# Patient Record
Sex: FEMALE | Race: WHITE | NOT HISPANIC OR LATINO | Employment: UNEMPLOYED | URBAN - METROPOLITAN AREA
[De-identification: names, ages, dates, MRNs, and addresses within clinical notes are randomized per-mention and may not be internally consistent; named-entity substitution may affect disease eponyms.]

---

## 2023-04-12 LAB
ANION GAP IN SER/PLAS: 16 MMOL/L (ref 10–20)
CALCIUM (MG/DL) IN SER/PLAS: 9 MG/DL (ref 8.6–10.3)
CARBON DIOXIDE, TOTAL (MMOL/L) IN SER/PLAS: 24 MMOL/L (ref 21–32)
CHLORIDE (MMOL/L) IN SER/PLAS: 100 MMOL/L (ref 98–107)
CREATININE (MG/DL) IN SER/PLAS: 1.32 MG/DL (ref 0.5–1.05)
GFR FEMALE: 50 ML/MIN/1.73M2
GLUCOSE (MG/DL) IN SER/PLAS: 105 MG/DL (ref 74–99)
POTASSIUM (MMOL/L) IN SER/PLAS: 4.5 MMOL/L (ref 3.5–5.3)
SODIUM (MMOL/L) IN SER/PLAS: 135 MMOL/L (ref 136–145)
THYROTROPIN (MIU/L) IN SER/PLAS BY DETECTION LIMIT <= 0.05 MIU/L: 0.79 MIU/L (ref 0.44–3.98)
UREA NITROGEN (MG/DL) IN SER/PLAS: 22 MG/DL (ref 6–23)

## 2023-04-13 LAB — CALCIDIOL (25 OH VITAMIN D3) (NG/ML) IN SER/PLAS: 37 NG/ML

## 2023-05-09 LAB
CALCIUM (MG/DL) IN URINE: 10.3 MG/DL
CALCIUM (MG/L) IN 24 HOUR URINE: 263 MG/24H (ref 100–300)
COLLECTION DURATION OF URINE: 24 HR
CREATININE (MG/24HR) IN 24 HOUR URINE: 1.13 G/24H (ref 0.67–1.59)
CREATININE (MG/DL) IN URINE: 44.4 MG/DL (ref 20–320)
VOLUME OF URINE: 2550 ML

## 2023-08-08 ENCOUNTER — HOSPITAL ENCOUNTER (OUTPATIENT)
Dept: DATA CONVERSION | Facility: HOSPITAL | Age: 48
Discharge: HOME | End: 2023-08-08
Attending: EMERGENCY MEDICINE

## 2023-08-08 DIAGNOSIS — R07.89 OTHER CHEST PAIN: ICD-10-CM

## 2023-08-08 DIAGNOSIS — R07.9 CHEST PAIN, UNSPECIFIED: ICD-10-CM

## 2023-08-08 LAB
ANION GAP SERPL CALCULATED.3IONS-SCNC: 11 MMOL/L (ref 0–19)
BASOPHILS # BLD AUTO: 0.03 K/UL (ref 0–0.22)
BASOPHILS NFR BLD AUTO: 0.7 % (ref 0–1)
BUN SERPL-MCNC: 23 MG/DL (ref 8–25)
BUN/CREAT SERPL: 16.4 RATIO (ref 8–21)
CALCIUM SERPL-MCNC: 9 MG/DL (ref 8.5–10.4)
CHLORIDE SERPL-SCNC: 105 MMOL/L (ref 97–107)
CO2 SERPL-SCNC: 24 MMOL/L (ref 24–31)
CREAT SERPL-MCNC: 1.4 MG/DL (ref 0.4–1.6)
DEPRECATED RDW RBC AUTO: 39.9 FL (ref 37–54)
DIFFERENTIAL METHOD BLD: ABNORMAL
EOSINOPHIL # BLD AUTO: 0.07 K/UL (ref 0–0.45)
EOSINOPHIL NFR BLD: 1.7 % (ref 0–3)
ERYTHROCYTE [DISTWIDTH] IN BLOOD BY AUTOMATED COUNT: 12.9 % (ref 11.7–15)
GFR SERPL CREATININE-BSD FRML MDRD: 47 ML/MIN/1.73 M2
GLUCOSE SERPL-MCNC: 115 MG/DL (ref 65–99)
HCT VFR BLD AUTO: 36 % (ref 36–44)
HGB BLD-MCNC: 12.3 GM/DL (ref 12–15)
HS TROPONIN T DELTA: 1 (ref 0–4)
HS TROPONIN T DELTA: NORMAL (ref 0–4)
IMM GRANULOCYTES # BLD AUTO: 0.01 K/UL (ref 0–0.1)
LYMPHOCYTES # BLD AUTO: 2.21 K/UL (ref 1.2–3.2)
LYMPHOCYTES NFR BLD MANUAL: 52.1 % (ref 20–40)
MCH RBC QN AUTO: 29.3 PG (ref 26–34)
MCHC RBC AUTO-ENTMCNC: 34.2 % (ref 31–37)
MCV RBC AUTO: 85.7 FL (ref 80–100)
MONOCYTES # BLD AUTO: 0.32 K/UL (ref 0–0.8)
MONOCYTES NFR BLD MANUAL: 7.5 % (ref 0–8)
NEUTROPHILS # BLD AUTO: 1.6 K/UL
NEUTROPHILS # BLD AUTO: 1.6 K/UL (ref 1.8–7.7)
NEUTROPHILS.IMMATURE NFR BLD: 0.2 % (ref 0–1)
NEUTS SEG NFR BLD: 37.8 % (ref 50–70)
NRBC BLD-RTO: 0 /100 WBC
PLATELET # BLD AUTO: 233 K/UL (ref 150–450)
PMV BLD AUTO: 8.5 CU (ref 7–12.6)
POTASSIUM SERPL-SCNC: 3.9 MMOL/L (ref 3.4–5.1)
RBC # BLD AUTO: 4.2 M/UL (ref 4–4.9)
SODIUM SERPL-SCNC: 140 MMOL/L (ref 133–145)
TROPONIN T SERPL-MCNC: 7 NG/L
TROPONIN T SERPL-MCNC: 8 NG/L
WBC # BLD AUTO: 4.2 K/UL (ref 4.5–11)

## 2023-10-16 PROBLEM — L65.9 ALOPECIA: Status: ACTIVE | Noted: 2023-10-16

## 2023-10-16 PROBLEM — N65.1 BREAST ASYMMETRY BETWEEN NATIVE BREAST AND RECONSTRUCTED BREAST: Status: ACTIVE | Noted: 2023-10-16

## 2023-10-16 PROBLEM — N17.9 ACUTE RENAL FAILURE SYNDROME (CMS-HCC): Status: ACTIVE | Noted: 2023-10-16

## 2023-10-16 PROBLEM — I10 HYPERTENSIVE DISORDER: Status: ACTIVE | Noted: 2023-10-16

## 2023-10-16 PROBLEM — R35.89 POLYURIA: Status: ACTIVE | Noted: 2023-10-16

## 2023-10-16 PROBLEM — E88.09 HYPOALBUMINEMIA: Status: ACTIVE | Noted: 2023-10-16

## 2023-10-16 PROBLEM — R91.1 PULMONARY NODULE, LEFT: Status: ACTIVE | Noted: 2023-10-16

## 2023-10-16 PROBLEM — R53.1 ASTHENIA: Status: ACTIVE | Noted: 2023-10-16

## 2023-10-16 PROBLEM — C73 CARCINOMA OF THYROID GLAND (MULTI): Status: ACTIVE | Noted: 2023-10-16

## 2023-10-16 PROBLEM — R74.8 ABNORMAL LIVER ENZYMES: Status: ACTIVE | Noted: 2023-10-16

## 2023-10-16 PROBLEM — R56.9 SEIZURE (MULTI): Status: ACTIVE | Noted: 2023-10-16

## 2023-10-16 PROBLEM — N62 MACROMASTIA: Status: ACTIVE | Noted: 2023-10-16

## 2023-10-16 PROBLEM — R55 VASOVAGAL SYNCOPE: Status: ACTIVE | Noted: 2023-10-16

## 2023-10-16 PROBLEM — C50.911 MALIGNANT NEOPLASM OF UNSPECIFIED SITE OF RIGHT FEMALE BREAST (MULTI): Status: ACTIVE | Noted: 2023-10-16

## 2023-10-16 PROBLEM — R53.81 MALAISE: Status: ACTIVE | Noted: 2023-10-16

## 2023-10-16 PROBLEM — G95.9 CERVICAL MYELOPATHY (MULTI): Status: ACTIVE | Noted: 2023-10-16

## 2023-10-16 PROBLEM — R20.2 PARESTHESIA: Status: ACTIVE | Noted: 2023-10-16

## 2023-10-16 PROBLEM — C77.3 MALIGNANT NEOPLASM METASTATIC TO LYMPH NODE OF UPPER EXTREMITY (MULTI): Status: ACTIVE | Noted: 2023-10-16

## 2023-10-16 PROBLEM — E83.42 HYPOMAGNESEMIA: Status: ACTIVE | Noted: 2023-10-16

## 2023-10-16 PROBLEM — D12.6 TUBULAR ADENOMA OF COLON: Status: ACTIVE | Noted: 2023-10-16

## 2023-10-16 PROBLEM — C50.219 MALIGNANT NEOPLASM OF UPPER-INNER QUADRANT OF FEMALE BREAST (MULTI): Status: ACTIVE | Noted: 2023-10-16

## 2023-10-16 PROBLEM — E89.2 POSTSURGICAL HYPOPARATHYROIDISM (MULTI): Status: ACTIVE | Noted: 2023-10-16

## 2023-10-16 PROBLEM — F41.9 ANXIETY: Status: ACTIVE | Noted: 2023-10-16

## 2023-10-16 PROBLEM — D64.9 ANEMIA: Status: ACTIVE | Noted: 2023-10-16

## 2023-10-16 PROBLEM — G47.00 INSOMNIA: Status: ACTIVE | Noted: 2023-10-16

## 2023-10-16 PROBLEM — Z86.0100 HISTORY OF COLONIC POLYPS: Status: ACTIVE | Noted: 2023-10-16

## 2023-10-16 PROBLEM — G89.29 CHRONIC BACK PAIN: Status: ACTIVE | Noted: 2023-10-16

## 2023-10-16 PROBLEM — R07.89 ATYPICAL CHEST PAIN: Status: ACTIVE | Noted: 2023-10-16

## 2023-10-16 PROBLEM — I20.9 ANGINA PECTORIS (CMS-HCC): Status: ACTIVE | Noted: 2023-10-16

## 2023-10-16 PROBLEM — E83.52 HYPERCALCEMIA: Status: ACTIVE | Noted: 2023-10-16

## 2023-10-16 PROBLEM — E83.51 HYPOCALCEMIA: Status: ACTIVE | Noted: 2023-10-16

## 2023-10-16 PROBLEM — R55 NEAR SYNCOPE: Status: ACTIVE | Noted: 2023-10-16

## 2023-10-16 PROBLEM — N39.3 STRESS INCONTINENCE: Status: ACTIVE | Noted: 2023-10-16

## 2023-10-16 PROBLEM — C73 MALIGNANT TUMOR OF THYROID GLAND (MULTI): Status: ACTIVE | Noted: 2023-10-16

## 2023-10-16 PROBLEM — Z86.010 HISTORY OF COLONIC POLYPS: Status: ACTIVE | Noted: 2023-10-16

## 2023-10-16 PROBLEM — G57.00 PIRIFORMIS SYNDROME: Status: ACTIVE | Noted: 2023-10-16

## 2023-10-16 PROBLEM — R06.09 DYSPNEA ON EXERTION: Status: ACTIVE | Noted: 2023-10-16

## 2023-10-16 PROBLEM — K58.1 IRRITABLE BOWEL SYNDROME WITH CONSTIPATION: Status: ACTIVE | Noted: 2023-10-16

## 2023-10-16 PROBLEM — E03.9 HYPOTHYROIDISM: Status: ACTIVE | Noted: 2023-10-16

## 2023-10-16 PROBLEM — H69.91 DYSFUNCTION OF RIGHT EUSTACHIAN TUBE: Status: ACTIVE | Noted: 2023-10-16

## 2023-10-16 PROBLEM — M54.9 CHRONIC BACK PAIN: Status: ACTIVE | Noted: 2023-10-16

## 2023-10-16 RX ORDER — EPINEPHRINE 0.3 MG/.3ML
1 INJECTION SUBCUTANEOUS
COMMUNITY
Start: 2014-01-21

## 2023-10-16 RX ORDER — CYCLOBENZAPRINE HCL 10 MG
10 TABLET ORAL 3 TIMES DAILY
COMMUNITY

## 2023-10-16 RX ORDER — CALCITRIOL 0.25 UG/1
1 CAPSULE ORAL DAILY
COMMUNITY
End: 2024-05-17 | Stop reason: WASHOUT

## 2023-10-16 RX ORDER — LEVOTHYROXINE SODIUM 200 UG/1
1 TABLET ORAL
COMMUNITY

## 2023-10-16 RX ORDER — ZOLPIDEM TARTRATE 10 MG/1
1 TABLET ORAL NIGHTLY PRN
COMMUNITY

## 2023-10-16 RX ORDER — TRAZODONE HYDROCHLORIDE 50 MG/1
1 TABLET ORAL NIGHTLY PRN
COMMUNITY

## 2023-10-16 RX ORDER — ESOMEPRAZOLE MAGNESIUM 40 MG/1
1 CAPSULE, DELAYED RELEASE ORAL EVERY 24 HOURS
COMMUNITY

## 2023-10-16 RX ORDER — ALPRAZOLAM 0.25 MG/1
0.25 TABLET ORAL EVERY 12 HOURS
COMMUNITY

## 2023-10-16 RX ORDER — PANTOPRAZOLE SODIUM 20 MG/1
1 TABLET, DELAYED RELEASE ORAL DAILY
COMMUNITY

## 2023-10-16 RX ORDER — SUCRALFATE 1 G/1
1 TABLET ORAL
COMMUNITY

## 2023-10-16 RX ORDER — LORAZEPAM 1 MG/1
1.5 TABLET ORAL EVERY 24 HOURS
COMMUNITY

## 2023-10-16 RX ORDER — HYDROCODONE BITARTRATE AND ACETAMINOPHEN 5; 325 MG/1; MG/1
1 TABLET ORAL EVERY 6 HOURS
COMMUNITY

## 2023-10-16 RX ORDER — BUSPIRONE HYDROCHLORIDE 30 MG/1
1 TABLET ORAL 2 TIMES DAILY
COMMUNITY
Start: 2016-08-22

## 2023-10-16 RX ORDER — CALCIUM CARBONATE 300MG(750)
1 TABLET,CHEWABLE ORAL NIGHTLY
COMMUNITY
Start: 2021-07-15

## 2023-10-16 RX ORDER — SERTRALINE HYDROCHLORIDE 100 MG/1
1.5 TABLET, FILM COATED ORAL DAILY
COMMUNITY
Start: 2014-09-18

## 2023-10-16 RX ORDER — DULOXETIN HYDROCHLORIDE 60 MG/1
60 CAPSULE, DELAYED RELEASE ORAL DAILY
COMMUNITY
End: 2024-03-19 | Stop reason: WASHOUT

## 2023-10-16 RX ORDER — GABAPENTIN 600 MG/1
1 TABLET ORAL DAILY
COMMUNITY

## 2023-10-16 RX ORDER — POLYETHYLENE GLYCOL 3350 17 G/17G
17 POWDER, FOR SOLUTION ORAL DAILY
COMMUNITY
Start: 2021-10-26

## 2023-11-30 ENCOUNTER — TELEPHONE (OUTPATIENT)
Dept: PAIN MEDICINE | Facility: CLINIC | Age: 48
End: 2023-11-30
Payer: COMMERCIAL

## 2023-11-30 ENCOUNTER — TELEPHONE (OUTPATIENT)
Dept: RADIOLOGY | Facility: HOSPITAL | Age: 48
End: 2023-11-30

## 2023-11-30 NOTE — TELEPHONE ENCOUNTER
"Patient states on voicemail message that she is \"having an issue with her medication and needs help\". Called patient, left voicemail message with my phone number. Will call her again Monday 12/4/23 as I am out of the office until then. Advised her to go to the ER or urgent care if the medication reaction is severe and/or she is having any trouble breathing. The voicemail message I received was very vague and did not name any specific medication so I am not sure which one she thinks is causing the issue.  "

## 2023-12-07 RX ORDER — TOPIRAMATE 100 MG/1
100 TABLET, FILM COATED ORAL 2 TIMES DAILY
Qty: 60 TABLET | Refills: 0 | OUTPATIENT
Start: 2023-12-07

## 2023-12-08 NOTE — PROGRESS NOTES
Subjective   Patient ID: Derrick Butler is a 48 y.o. female who presents to office for breast examination.  HPI  Patient has not been in office since 10/04/2021, patient with normal imaging and breast examination at that time was to follow up in a year.   Patient had diagnostic bilateral mammogram on 03/02/2023 with comparison imaging to 08/24/2021, 02/14/2020 and 10/15/2019 it demonstrated; the parenchyma of both breasts remain mainly fatty replaced, no significant change since the prior study. Category 1.   No new oncology notes.     Past Medical History:   Diagnosis Date    Breast cancer (CMS/[facility]) 03/01/2018    Right Breast - IDC    Chronic rhinitis 05/04/2015    Rhinosinusitis    Fibromyalgia     Personal history of gestational diabetes     History of gestational diabetes    Personal history of other diseases of the musculoskeletal system and connective tissue     Personal history of fibromyalgia    Personal history of other diseases of the nervous system and sense organs     History of migraine    Personal history of other diseases of the respiratory system 05/04/2015    History of sore throat    Personal history of other mental and behavioral disorders     History of depression    Personal history of other specified conditions     History of insomnia    Thyroid cancer (CMS/[facility]) 2013      Past Surgical History:   Procedure Laterality Date    ADENOIDECTOMY  11/10/2016    Adenoidectomy    AXILLARY NODE DISSECTION  06/20/2018    Right Axillary Dissection    BACK SURGERY  02/10/2014    Back Surgery    BI MAMMO GUIDED LOCALIZATION BREAST RIGHT Right 05/16/2018    Wire localization lumpectomy right breast with sentinel node biopsy    BI STEREOTACTIC GUIDED BREAST RIGHT LOCALIZATION AND BIOPSY Right 04/18/2018    BI STEREOTACTIC GUIDED BREAST LOCALIZATION AND BIOPSY RIGHT [facility]    BI US GUIDED BREAST LOCALIZATION AND BIOPSY LEFT Left 03/22/2018    BI US GUIDED BREAST LOCALIZATION AND BIOPSY LEFT [facility]    BLADDER SURGERY  02/10/2014    Bladder Surgery    CHOLECYSTECTOMY  02/10/2014    Cholecystectomy    COLONOSCOPY      OTHER SURGICAL HISTORY  05/07/2019    Lumpectomy    OTHER SURGICAL HISTORY  05/07/2019    Breast reconstruction    OTHER SURGICAL HISTORY  05/07/2019    Axillary lymphadenectomy    OTHER SURGICAL HISTORY  05/22/2017    Primary Repair Of Ruptured Achilles Tendon    TONSILLECTOMY  02/10/2014    Tonsillectomy    TOTAL ABDOMINAL HYSTERECTOMY W/ BILATERAL SALPINGOOPHORECTOMY  2019    TOTAL THYROIDECTOMY  11/10/2016    Thyroid Surgery Total Thyroidectomy    TUBAL LIGATION  02/10/2014    Tubal Ligation      Family History   Problem Relation Name Age of Onset    Breast cancer Mother's Sister      Breast cancer Maternal Grandmother        Allergies   Allergen Reactions    Docetaxel Shortness of breath    Meloxicam Shortness of breath, Hives and Rash    Oxycodone-Acetaminophen Hives, Shortness of breath, Itching and Swelling    Pregabalin Palpitations      SOCIAL HISTORY:   Patient is  she lives with her spouse, she does not currently work.   Review of Systems   Constitutional:  Negative for appetite change, fever and unexpected weight change.   HENT:  Negative for congestion and trouble swallowing.    Respiratory:  Negative for cough and shortness of breath.    Cardiovascular:  Negative for chest pain and palpitations.   Gastrointestinal:  Negative for abdominal pain, diarrhea and nausea.   Genitourinary:  Negative for difficulty urinating and dysuria.   Musculoskeletal:  Negative for back pain and gait problem.   Skin:  Negative for color change and rash.   Neurological:  Negative for dizziness, speech difficulty and headaches.   Hematological:  Does not bruise/bleed easily.   Psychiatric/Behavioral:  Negative for confusion and suicidal ideas.      Objective   Physical Exam  Physical Exam:  GENERAL APPEARANCE: Patient appears in no acute distress.   EYES: Sclera non-icteric, PERRLA.    ENT Normal appearance of ears and nose.   NECK/THYROID: Neck: no masses. Thyroid: no masses.   LYMPH NODES: No cervical or supraclavicular lymphadenopathy.   CARDIOVASCULAR Heart: RRR, no murmurs; Carotid bruits: none; Peripheral edema: none.   RESPIRATORY: Lungs: Bilateral inspiratory and expiratory wheezing; no respiratory distress.   BREASTS: Exam done both in upright and supine position. On inspection no skin dimpling, no peau d'orange; Masses: none palpable in either breast.  Axillary lymphadenopathy: none.   GI (ABDOMEN) No intraabdominal mass appreciated, no hepatosplenomegaly; Hernia: none; Tenderness: none.   PSYCH: Patient oriented to time, place and person, normal affect.    Assessment/Plan   Problem List Items Addressed This Visit    None       BREAST HISTORY:   Patient felt a lump in the right breast laterally. She underwent a bilateral diagnostic mammogram on 2/22/18 that showed a large spiculated lesion in the lateral aspect in the right breast medium depth, measuring approximately 3 cm, with associated pleomophic microcalcifications. In the left breast laterally was an oval nodule measuring 11 x 6 mm with partially obscured borders.   US of the right revealed an irregular hypochoic mass at 9 o'clock, 12 cm DFN, measuring 2.9 x 3.3 cm, with spiculated margins, posterior shadowing, and internal blood flow. Birads category 5. US-guided biopsy was recommended. US of the left revealed a normal hypoechoic lesion at 2 o'clock, 9.7 cm DFN, measuring 3.7 x 4.7 x 8.8 mm with a slightly irregular border and possible minimal posterior shadowing. Birads category 4a. US-guided biopsy was recommended here as well. Films were reviewed. She first felt the mass in December. No pain, no nipple discharge.   She underwent bilateral core biopsies on 3/1/18. Pathology for the right breast revealed invasive ductal carcinoma, with the tumor measuring up to 0.3 cm in greatest contiguous dimension, grade 1 of 3, ER/DE+m  Fgl9dkg negative, FISH not performed. Pathology for the left breast mass showed mildfiborcystic change including duct ectasia, mammary fibrosis, columnar cell change, usual ductal hyperplasia, and microcalcifications.     She had undergone an additional US-guided biopsy on 3/19 of the left breast at 2 o'clock that revealed pseudo-angiomatous stromal hyperplasia and fibrocystic changes; fat necrosis, giant cell reaction and chronic inflammation, with no evidence of malignancy.    Her BRCA testing came back negative. She underwent a right breast stereotactic biopsy on 4/13 to r/o DCIS. Pathology revealed breast parenchyma with focal usual ductal hyperplasia and apocrine metaplasia with microcalcifications.  The patient is s/p right breast wire localization/lumpectomy/sentinel node biopsy on 5/16/8, with Dr. Craven performing bilateral breast reduction following.  Pathology revealed invasive ductal carcinoma, with the tumor measuring 4.4 x 4 x 1.8 cm, with disease present at the medial margin but intra-operatively I took more medial margin which was negative so we had negative margins.  tumor was   invasive ductal carcinoma and intraductal papillomatosis, grade 2 of 3; DCIS, cribriform and comedo type, intermediate to high nuclear grade, with clean margins; and fibrocysitic changes. At the sentinel node excision site, metastic carcinoma involving 3 of 3 lymph nodes was present, the largest measuring 0.8 cm in greatest dimension.  The new medial margin excision showed focal invasive ductal carcinoma, as mentioned earlier with clean margn. Right breast tissue taken as part of the reduction, showed no evidence of malignancy, with ductal epithelial hyperplasia, usual type; and fibrocystic changes with focal microcalcifications. Skin showed no evidence of malignancy. Left breast tissue showed no evidence of malignancy; ductal epithelial hyperplasia, usual type; and fibrocystic changes with microcalcifications. Er/AL+,  Aon7vod negative, FISH not performed.   It was recommedned to perform a regional axillary disseciotn based on 3/3 postive nodes.   The patient is s/p a right axillary dissection on 6/20/18. .    Six lymph nodes were negative for malignancy.  She missed her postop appt on 6/20 as she was in the hospital for a  hypercalcemia, weakness, dizziness, constipation, elevated liver enzymes and dry mouth as well as an acute kidney injury. She was treated with IV fluids and Lasix and was released on 7/2.  She had a MediPort placed while I was out of town and tried chemotherapy but had bad reactions.  She refused radiation therapy due to her bad reaction to chemotherapy.  She was to start hormone therapy. Her mediport was removed on 4/2/19.  The patient felt a new lump in the right breast at 6 o'clock mid anterior depth.  She underwent a bilateral diagnostic mammogram with guero on 8/24/21, compared to diagnostic imaging on 12/9/20, 2/13/20, 10/15/19, 4/10/19, 2/22/18 and 1/5/18.  Noted were scattered areas of fibroglandular density.  Right breast showed a 6 mm nodular density at 12 o'clock, likely present and unchanged since her mammogram from 2/13/20.  Right ultrasound revealed no change in the palpable area of concern at 6 o'clock.  No solid or cystic mass was noted in that area.  However, at 12 o'clock, 2.8 cm DFN is a cyst with a small amount of internal debris measuring 6 mm.  Birads category 2.  Films were reviewed.  Her last breast check with me was on 10/19/20. At that time, her axillary incision had healed nicely.  Some nodules on the inframammary crease of both breasts were noted.  She had otherwise normal imaging and exam.  She was put on Arimidex per Dr. Poon on 4/17/19.  Previous Biopsy: no Menarche Age: 14 Age of first delivery: 22 Breastfeed: no Menopause age: not yet HRT: no Family history of breast cancer: ys Family history of ovarian cancer: no Family history of pancreatic cancer: no G 3 P 2     Sadia SIMPSON  MD Radha 12/08/23 1:34 PM

## 2023-12-11 DIAGNOSIS — M79.7 FIBROMYALGIA: ICD-10-CM

## 2023-12-11 DIAGNOSIS — M54.6 PAIN IN THORACIC SPINE: ICD-10-CM

## 2023-12-11 DIAGNOSIS — M54.2 NECK PAIN: ICD-10-CM

## 2023-12-11 RX ORDER — TOPIRAMATE 25 MG/1
TABLET ORAL
Qty: 252 TABLET | Refills: 0 | Status: CANCELLED | OUTPATIENT
Start: 2023-12-11

## 2023-12-11 NOTE — TELEPHONE ENCOUNTER
Patient keeps calling central scheduling...says she is having issues with topiramate and wants to stop. Another message says she needs a refill. I have left 2 messages with the patient asking for clarification along with my phone # but she never calls me back. Pharmacy sent message for refill. Waiting for patient to call me back to see if she wants to wean off medication.  UPDATE:  Patient wants to see if lesser dose will help with side effects. Renewed rx at 75mg BID, explain she can go a bit lower if she would like. Invited her to call back after she tries the new dose.

## 2023-12-12 RX ORDER — TOPIRAMATE 100 MG/1
100 TABLET, FILM COATED ORAL 2 TIMES DAILY
Qty: 60 TABLET | Refills: 11 | Status: SHIPPED | OUTPATIENT
Start: 2023-12-12 | End: 2023-12-12 | Stop reason: WASHOUT

## 2023-12-12 RX ORDER — TOPIRAMATE 25 MG/1
75 TABLET ORAL 2 TIMES DAILY
Qty: 180 TABLET | Refills: 2 | Status: SHIPPED | OUTPATIENT
Start: 2023-12-12 | End: 2024-03-19 | Stop reason: WASHOUT

## 2023-12-12 RX ORDER — TOPIRAMATE 25 MG/1
75 TABLET ORAL 2 TIMES DAILY
COMMUNITY
End: 2023-12-12 | Stop reason: SDUPTHER

## 2023-12-13 ENCOUNTER — TELEPHONE (OUTPATIENT)
Dept: PAIN MEDICINE | Facility: CLINIC | Age: 48
End: 2023-12-13
Payer: COMMERCIAL

## 2023-12-13 NOTE — TELEPHONE ENCOUNTER
Representative from patient's insurance left me a message that Derrick wants home PT due to severe anxiety. Attempted to call patient. Her phone hung up while I was leaving a voicemail. I called back and there was extremely loud static on phone line but I attempted to leave another message. I am unable to refer her to home health care for PT at this time because it has been greater than 90 days since her last in person visit. Per Dr. Vazquez, she should try to see in her PCP can put in home referral as they may be able to see her sooner and address the anxiety issues. Otherwise the patient will need to be seen for a follow up before we can put home PT order in.

## 2023-12-14 ENCOUNTER — OFFICE VISIT (OUTPATIENT)
Dept: SURGERY | Facility: CLINIC | Age: 48
End: 2023-12-14
Payer: COMMERCIAL

## 2023-12-14 VITALS
WEIGHT: 215 LBS | RESPIRATION RATE: 20 BRPM | HEIGHT: 62 IN | SYSTOLIC BLOOD PRESSURE: 135 MMHG | DIASTOLIC BLOOD PRESSURE: 84 MMHG | BODY MASS INDEX: 39.56 KG/M2 | TEMPERATURE: 98.3 F

## 2023-12-14 DIAGNOSIS — C50.211 MALIGNANT NEOPLASM OF UPPER-INNER QUADRANT OF RIGHT BREAST IN FEMALE, ESTROGEN RECEPTOR NEGATIVE (MULTI): ICD-10-CM

## 2023-12-14 DIAGNOSIS — Z17.1 MALIGNANT NEOPLASM OF UPPER-INNER QUADRANT OF RIGHT BREAST IN FEMALE, ESTROGEN RECEPTOR NEGATIVE (MULTI): ICD-10-CM

## 2023-12-14 PROCEDURE — 99203 OFFICE O/P NEW LOW 30 MIN: CPT | Performed by: SURGERY

## 2023-12-14 PROCEDURE — 1036F TOBACCO NON-USER: CPT | Performed by: SURGERY

## 2023-12-14 PROCEDURE — 99213 OFFICE O/P EST LOW 20 MIN: CPT | Performed by: SURGERY

## 2023-12-14 PROCEDURE — 3075F SYST BP GE 130 - 139MM HG: CPT | Performed by: SURGERY

## 2023-12-14 PROCEDURE — 3079F DIAST BP 80-89 MM HG: CPT | Performed by: SURGERY

## 2023-12-14 ASSESSMENT — ENCOUNTER SYMPTOMS
ABDOMINAL PAIN: 0
TROUBLE SWALLOWING: 0
DIZZINESS: 0
BACK PAIN: 0
COLOR CHANGE: 0
HEADACHES: 0
COUGH: 0
SHORTNESS OF BREATH: 0
BRUISES/BLEEDS EASILY: 0
DIARRHEA: 0
DIFFICULTY URINATING: 0
UNEXPECTED WEIGHT CHANGE: 0
PALPITATIONS: 0
NAUSEA: 0
DYSURIA: 0
CONFUSION: 0
APPETITE CHANGE: 0
SPEECH DIFFICULTY: 0
FEVER: 0

## 2023-12-14 ASSESSMENT — PAIN SCALES - GENERAL: PAINLEVEL: 0-NO PAIN

## 2024-01-11 ENCOUNTER — HOME HEALTH ADMISSION (OUTPATIENT)
Dept: HOME HEALTH SERVICES | Facility: HOME HEALTH | Age: 49
End: 2024-01-11
Payer: COMMERCIAL

## 2024-01-11 ENCOUNTER — DOCUMENTATION (OUTPATIENT)
Dept: HOME HEALTH SERVICES | Facility: HOME HEALTH | Age: 49
End: 2024-01-11

## 2024-01-11 ENCOUNTER — TELEPHONE (OUTPATIENT)
Dept: HOME HEALTH SERVICES | Facility: HOME HEALTH | Age: 49
End: 2024-01-11

## 2024-01-11 ENCOUNTER — OFFICE VISIT (OUTPATIENT)
Dept: PAIN MEDICINE | Facility: CLINIC | Age: 49
End: 2024-01-11
Payer: COMMERCIAL

## 2024-01-11 VITALS — RESPIRATION RATE: 16 BRPM

## 2024-01-11 DIAGNOSIS — F41.9 ANXIETY: ICD-10-CM

## 2024-01-11 DIAGNOSIS — M54.12 CERVICAL NEURITIS: Primary | ICD-10-CM

## 2024-01-11 DIAGNOSIS — M79.7 FIBROMYALGIA: ICD-10-CM

## 2024-01-11 PROCEDURE — 1036F TOBACCO NON-USER: CPT | Performed by: PHYSICAL MEDICINE & REHABILITATION

## 2024-01-11 PROCEDURE — 99214 OFFICE O/P EST MOD 30 MIN: CPT | Performed by: PHYSICAL MEDICINE & REHABILITATION

## 2024-01-11 ASSESSMENT — PAIN SCALES - GENERAL: PAINLEVEL: 6

## 2024-01-11 NOTE — ASSESSMENT & PLAN NOTE
The patient was explained that the mainstay of managing fibromyalgia/myofascial pain is to participate in low-impact aerobic exercising for 1 hour day, 6 days a week. Examples of low impact aerobic exercising include swimming, riding a stationary bicycle or exercising on an elliptical machine. Low-impact aerobic exercising at that rate results in much better pain relief than any of the medications that could be prescribed for fibromyalgia/myofascial pain and without side effects. The patient must be compliant, however, and must participate in such therapy for 8 weeks consecutively before noticing a remarkable response. It was explained to the patient that one could titrate up from a smaller duration of exercising by increasing the time spent doing the low-impact aerobic exercise in small increments such as 1 minute every day until meeting the 60 minutes a day, 6 days a week goal. The patient was receptive to the counseling and agrees to continue to exercise toward this goal.

## 2024-01-11 NOTE — PROGRESS NOTES
Subjective   Patient ID: Derrick Butler is a 49 y.o. female who presents for Neck Pain.  HPI    48-year-old female with history of fibromyalgia as well as neck pain with upper extremity radicular symptoms.  We have been trying to get physical therapy however patient has a significant amount of social anxiety and has been unable to leave her house to go to physical therapy and she is here today to request home health physical therapy.  Otherwise her symptoms are fairly similar to previous evaluation.  She is weaning off the topiramate if she was felt like it was causing her heart to race.  Continues to take gabapentin 300 mg 3 times daily.  Did not tolerate duloxetine.                Monitoring and compliance:    ORT:    PDUQ:    Office Agreement:      Review of Systems     Current Outpatient Medications   Medication Instructions    ALPRAZolam (XANAX) 0.25 mg, oral, Every 12 hours    busPIRone (Buspar) 30 mg tablet 1 tablet, oral, 2 times daily    calcitriol (Rocaltrol) 0.25 mcg capsule 1 capsule, oral, Daily    calcium carbonate (CALTRATE 600 ORAL) 2 tablets, oral, Daily    cyclobenzaprine (FLEXERIL) 10 mg, oral, 3 times daily    DULoxetine (CYMBALTA) 60 mg, oral, Daily    EPINEPHrine (EPIPEN) 1 mg,  Inject once in muscular portion of leg if having severe allergic reaction    esomeprazole (NexIUM) 40 mg DR capsule 1 capsule, oral, Every 24 hours    gabapentin (Neurontin) 600 mg tablet 1 tablet, oral, Daily    HYDROcodone-acetaminophen (Norco) 5-325 mg tablet 1 tablet, oral, Every 6 hours    levothyroxine (Synthroid, Levoxyl) 200 mcg tablet 1 tablet, oral, Daily before breakfast    LORazepam (Ativan) 1 mg tablet 1.5 tablets, oral, Every 24 hours    melatonin 10 mg tablet,disintegrating 1 tablet, oral, Nightly    pantoprazole (ProtoNix) 20 mg EC tablet 1 tablet, oral, Daily    polyethylene glycol (GLYCOLAX, MIRALAX) 17 g, oral, Daily, IN 8 OUNCES OF WATER, JUICE, OR TEA    sertraline (Zoloft) 100 mg tablet 1.5 tablets,  oral, Daily    sucralfate (CARAFATE) 1 g, oral, 2 times daily before meals    topiramate (TOPAMAX) 75 mg, oral, 2 times daily    traZODone (Desyrel) 50 mg tablet 1 tablet, oral, Nightly PRN    zolpidem (Ambien) 10 mg tablet 1 tablet, oral, Nightly PRN        Past Medical History:   Diagnosis Date    Breast cancer (CMS/[facility]) 03/01/2018    Right Breast - IDC    Chronic rhinitis 05/04/2015    Rhinosinusitis    Fibromyalgia     Hx antineoplastic chemo 2018    rt breast cancer    Personal history of gestational diabetes     History of gestational diabetes    Personal history of other diseases of the musculoskeletal system and connective tissue     Personal history of fibromyalgia    Personal history of other diseases of the nervous system and sense organs     History of migraine    Personal history of other diseases of the respiratory system 05/04/2015    History of sore throat    Personal history of other mental and behavioral disorders     History of depression    Personal history of other specified conditions     History of insomnia    Thyroid cancer (CMS/[facility]) 2013        Past Surgical History:   Procedure Laterality Date    ADENOIDECTOMY  11/10/2016    Adenoidectomy    AXILLARY NODE DISSECTION  06/20/2018    Right Axillary Dissection    BACK SURGERY  02/10/2014    Back Surgery    BI MAMMO GUIDED LOCALIZATION BREAST RIGHT Right 05/16/2018    Wire localization lumpectomy right breast with sentinel node biopsy    BI STEREOTACTIC GUIDED BREAST RIGHT LOCALIZATION AND BIOPSY Right 04/18/2018    BI STEREOTACTIC GUIDED BREAST LOCALIZATION AND BIOPSY RIGHT [facility]    BI US GUIDED BREAST LOCALIZATION AND BIOPSY LEFT Left 03/22/2018    BI US GUIDED BREAST LOCALIZATION AND BIOPSY LEFT [facility]    BLADDER SURGERY  02/10/2014    Bladder Surgery    BREAST RECONSTRUCTION Bilateral 2018    Rt breast lumpectomy, bilat reconstruction    CHOLECYSTECTOMY  02/10/2014    Cholecystectomy    COLONOSCOPY      OTHER  SURGICAL HISTORY  05/07/2019    Lumpectomy    OTHER SURGICAL HISTORY  05/07/2019    Breast reconstruction    OTHER SURGICAL HISTORY  05/07/2019    Axillary lymphadenectomy    OTHER SURGICAL HISTORY  05/22/2017    Primary Repair Of Ruptured Achilles Tendon    TONSILLECTOMY  02/10/2014    Tonsillectomy    TOTAL ABDOMINAL HYSTERECTOMY W/ BILATERAL SALPINGOOPHORECTOMY  2019    TOTAL THYROIDECTOMY  11/10/2016    Thyroid Surgery Total Thyroidectomy    TUBAL LIGATION  02/10/2014    Tubal Ligation        Family History   Problem Relation Name Age of Onset    Breast cancer Mother's Sister      Breast cancer Maternal Grandmother          Allergies   Allergen Reactions    Docetaxel Shortness of breath    Meloxicam Shortness of breath, Hives and Rash    Oxycodone-Acetaminophen Hives, Shortness of breath, Itching and Swelling    Pregabalin Palpitations        Objective     Vitals:    01/11/24 1420   Resp: 16        Physical Exam    GENERAL EXAM  Vital Signs: Vital signs to include heart rate, respiration rate, blood pressure, and temperature were reviewed.  General Appearance:  Awake, alert, healthy appearing, well developed, No acute distress.  Head: Normocephalic without evidence of head injury.  Neck: The appearance of the neck was normal without swelling with a midline trachea.  Eyes: The eyelids and eyebrows exhibited no abnormalities.  Pupils were not pin-point.  Sclera was without icterus.  Lungs: Respiration rhythm and depth was normal.  Respiratory movements were normal without labored breathing.  Cardiovascular: No peripheral edema was present.    Neurological: Patient was oriented to time, place, and person.  Speech was normal.  Balance, gait, and stance were unremarkable.    Psychiatric: Appearance was normal with appropriate dress.  Mood was euthymic and affect was normal.  Skin: Affected regions were without ecchymosis or skin lesions.        Physical exam as above except:        Assessment/Plan   Problem List  Items Addressed This Visit             ICD-10-CM    Anxiety F41.9    Relevant Orders    Referral to Home [facility]    Fibromyalgia M79.7     The patient was explained that the mainstay of managing fibromyalgia/myofascial pain is to participate in low-impact aerobic exercising for 1 hour day, 6 days a week. Examples of low impact aerobic exercising include swimming, riding a stationary bicycle or exercising on an elliptical machine. Low-impact aerobic exercising at that rate results in much better pain relief than any of the medications that could be prescribed for fibromyalgia/myofascial pain and without side effects. The patient must be compliant, however, and must participate in such therapy for 8 weeks consecutively before noticing a remarkable response. It was explained to the patient that one could titrate up from a smaller duration of exercising by increasing the time spent doing the low-impact aerobic exercise in small increments such as 1 minute every day until meeting the 60 minutes a day, 6 days a week goal. The patient was receptive to the counseling and agrees to continue to exercise toward this goal.         Relevant Orders    Referral to Home [facility]    Cervical neuritis - Primary M54.12     48-year-old female with history of fibromyalgia as well as neck pain with radiation into her upper extremities.  We have not been able to do physical therapy as of yet because patient has severe social anxiety and has been unable to leave her home to go to physical therapy patient.  She is here to request home physical therapy.  In addition she is weaning off of the topiramate as she feels like it was causing her heart palpitations.  This is improving and she is weaning off.  Our plan for today:  - I will place an order for physical therapy through home health as patient has severe social anxiety and is unable to leave her home on a regular basis to attend physical therapy.  - We will increase her gabapentin to 600 mg  in the evening and 300 mg in the morning and afternoon.  She will call me when she needs a refill of this medication.  - If she continues to have pain after 6 weeks of physical therapy may follow up         Relevant Orders    Referral to Home Health            This note was generated with the aid of dictation software, there may be typos despite my attempts at proofreading.

## 2024-01-11 NOTE — HH CARE COORDINATION
Home Care received a Referral for Physical Therapy. We have processed the referral for a Start of Care on 1.12 to 1.14.2024.     If you have any questions or concerns, please feel free to contact us at [phone]. Follow the prompts, enter your five digit zip code, and you will be directed to your care team on EAST 1.

## 2024-01-11 NOTE — TELEPHONE ENCOUNTER
I am reviewing the referral for home care PT services. You have indicated that the patient's PCP will follow the home care. Unfortunately, the PCP is from [facility] and we do not accept [facility] physicians to follow home care as they do not sign orders/485 in a timely manner.  If you are willing to follow the home care, we would be able to accept. Otherwise, you will have to refer to [facility] or another agency for services.

## 2024-01-11 NOTE — ASSESSMENT & PLAN NOTE
48-year-old female with history of fibromyalgia as well as neck pain with radiation into her upper extremities.  We have not been able to do physical therapy as of yet because patient has severe social anxiety and has been unable to leave her home to go to physical therapy patient.  She is here to request home physical therapy.  In addition she is weaning off of the topiramate as she feels like it was causing her heart palpitations.  This is improving and she is weaning off.  Our plan for today:  - I will place an order for physical therapy through home health as patient has severe social anxiety and is unable to leave her home on a regular basis to attend physical therapy.  - We will increase her gabapentin to 600 mg in the evening and 300 mg in the morning and afternoon.  She will call me when she needs a refill of this medication.  - If she continues to have pain after 6 weeks of physical therapy may follow up

## 2024-01-18 ENCOUNTER — HOME CARE VISIT (OUTPATIENT)
Dept: HOME HEALTH SERVICES | Facility: HOME HEALTH | Age: 49
End: 2024-01-18
Payer: COMMERCIAL

## 2024-01-18 VITALS — HEART RATE: 98 BPM | DIASTOLIC BLOOD PRESSURE: 84 MMHG | RESPIRATION RATE: 16 BRPM | SYSTOLIC BLOOD PRESSURE: 118 MMHG

## 2024-01-18 PROCEDURE — 0023 HH SOC

## 2024-01-18 PROCEDURE — G0151 HHCP-SERV OF PT,EA 15 MIN: HCPCS

## 2024-01-18 ASSESSMENT — ACTIVITIES OF DAILY LIVING (ADL)
OASIS_M1830: 02
ENTERING_EXITING_HOME: SUPERVISION
AMBULATION ASSISTANCE ON FLAT SURFACES: 1

## 2024-01-18 ASSESSMENT — ENCOUNTER SYMPTOMS
PAIN: 1
LOWEST PAIN SEVERITY IN PAST 24 HOURS: 3/10
PAIN SEVERITY GOAL: 0/10
PERSON REPORTING PAIN: PATIENT
PAIN LOCATION - PAIN FREQUENCY: CONSTANT
SUBJECTIVE PAIN PROGRESSION: GRADUALLY IMPROVING
HIGHEST PAIN SEVERITY IN PAST 24 HOURS: 7/10
PAIN LOCATION - PAIN DURATION: CHRONIC
PAIN LOCATION - RELIEVING FACTORS: MEDS, REST, ICE
PAIN LOCATION - PAIN SEVERITY: 4/10
PAIN LOCATION - EXACERBATING FACTORS: ACTIVITY
PAIN LOCATION: NECK

## 2024-01-19 ENCOUNTER — APPOINTMENT (OUTPATIENT)
Dept: RADIOLOGY | Facility: HOSPITAL | Age: 49
End: 2024-01-19
Payer: COMMERCIAL

## 2024-01-25 ENCOUNTER — HOME CARE VISIT (OUTPATIENT)
Dept: HOME HEALTH SERVICES | Facility: HOME HEALTH | Age: 49
End: 2024-01-25
Payer: COMMERCIAL

## 2024-01-26 ENCOUNTER — HOSPITAL ENCOUNTER (OUTPATIENT)
Dept: RADIOLOGY | Facility: HOSPITAL | Age: 49
Discharge: HOME | End: 2024-01-26
Payer: COMMERCIAL

## 2024-01-26 VITALS — WEIGHT: 215 LBS | HEIGHT: 62 IN | BODY MASS INDEX: 39.56 KG/M2

## 2024-01-26 DIAGNOSIS — Z17.1 MALIGNANT NEOPLASM OF UPPER-INNER QUADRANT OF RIGHT BREAST IN FEMALE, ESTROGEN RECEPTOR NEGATIVE (MULTI): ICD-10-CM

## 2024-01-26 DIAGNOSIS — C50.211 MALIGNANT NEOPLASM OF UPPER-INNER QUADRANT OF RIGHT BREAST IN FEMALE, ESTROGEN RECEPTOR NEGATIVE (MULTI): ICD-10-CM

## 2024-01-26 PROCEDURE — 77062 BREAST TOMOSYNTHESIS BI: CPT

## 2024-01-30 ENCOUNTER — HOME CARE VISIT (OUTPATIENT)
Dept: HOME HEALTH SERVICES | Facility: HOME HEALTH | Age: 49
End: 2024-01-30
Payer: COMMERCIAL

## 2024-01-30 PROCEDURE — G0151 HHCP-SERV OF PT,EA 15 MIN: HCPCS

## 2024-01-30 ASSESSMENT — ENCOUNTER SYMPTOMS
PAIN LOCATION - PAIN QUALITY: ACHING
LOWEST PAIN SEVERITY IN PAST 24 HOURS: 5/10
HIGHEST PAIN SEVERITY IN PAST 24 HOURS: 8/10
PERSON REPORTING PAIN: PATIENT
PAIN SEVERITY GOAL: 0/10
PAIN LOCATION - PAIN DURATION: CHRONIC
SUBJECTIVE PAIN PROGRESSION: GRADUALLY IMPROVING
PAIN LOCATION - PAIN SEVERITY: 6/10
PAIN LOCATION - EXACERBATING FACTORS: ACTIVITY
PAIN LOCATION: NECK
PAIN LOCATION - RELIEVING FACTORS: REST
PAIN LOCATION - PAIN FREQUENCY: CONSTANT
PAIN: 1

## 2024-01-31 PROCEDURE — G0180 MD CERTIFICATION HHA PATIENT: HCPCS | Performed by: PHYSICAL MEDICINE & REHABILITATION

## 2024-02-07 ENCOUNTER — APPOINTMENT (OUTPATIENT)
Dept: HOME HEALTH SERVICES | Facility: HOME HEALTH | Age: 49
End: 2024-02-07
Payer: COMMERCIAL

## 2024-02-13 ENCOUNTER — HOME CARE VISIT (OUTPATIENT)
Dept: HOME HEALTH SERVICES | Facility: HOME HEALTH | Age: 49
End: 2024-02-13
Payer: COMMERCIAL

## 2024-02-13 VITALS
OXYGEN SATURATION: 98 % | RESPIRATION RATE: 20 BRPM | SYSTOLIC BLOOD PRESSURE: 118 MMHG | DIASTOLIC BLOOD PRESSURE: 78 MMHG

## 2024-02-13 PROCEDURE — G0299 HHS/HOSPICE OF RN EA 15 MIN: HCPCS

## 2024-02-13 ASSESSMENT — ENCOUNTER SYMPTOMS
FORGETFULNESS: 1
DESCRIPTION OF MEMORY LOSS: SHORT TERM
PAIN LOCATION - RELIEVING FACTORS: REST MEDS
PAIN LOCATION - RELIEVING FACTORS: REST MEDS
DESCRIPTION OF MEMORY LOSS: IMMEDIATE
PAIN LOCATION - PAIN FREQUENCY: FREQUENT
FATIGUES EASILY: 1
MUSCLE WEAKNESS: 1
PAIN LOCATION - PAIN SEVERITY: 4/10
HIGHEST PAIN SEVERITY IN PAST 24 HOURS: 8/10
DEPRESSION: 1
SUBJECTIVE PAIN PROGRESSION: UNCHANGED
PERSON REPORTING PAIN: PATIENT
PAIN SEVERITY GOAL: 0/10
LOWEST PAIN SEVERITY IN PAST 24 HOURS: 3/10
PAIN LOCATION - PAIN SEVERITY: 4/10
APPETITE LEVEL: FAIR
DIZZINESS: 1
PAIN LOCATION: GENERALIZED
PAIN LOCATION - PAIN QUALITY: GENERALIZED
FATIGUE: 1
PAIN LOCATION: NECK
PAIN LOCATION - PAIN FREQUENCY: FREQUENT
CHANGE IN APPETITE: UNCHANGED
LIMITED RANGE OF MOTION: 1
PAIN LOCATION - EXACERBATING FACTORS: VARIES
PAIN: 1
DEPRESSED MOOD: 1

## 2024-02-13 ASSESSMENT — ACTIVITIES OF DAILY LIVING (ADL)
TRANSPORTATION ASSESSED: 1
LAUNDRY: NEEDS ASSISTANCE
BATHING_CURRENT_FUNCTION: STAND BY ASSIST
USING THE TELPHONE: INDEPENDENT
SHOPPING: NEEDS ASSISTANCE
FEEDING ASSESSED: 1
AMBULATION ASSISTANCE: INDEPENDENT
AMBULATION ASSISTANCE: 1
FEEDING: INDEPENDENT
BATHING ASSESSED: 1
ORAL_CARE_CURRENT_FUNCTION: INDEPENDENT
TOILETING: INDEPENDENT
TRANSPORTATION: DEPENDENT
GROOMING ASSESSED: 1
PHYSICAL TRANSFERS ASSESSED: 1
LIGHT HOUSEKEEPING: NEEDS ASSISTANCE
DRESSING_UB_CURRENT_FUNCTION: INDEPENDENT
GROOMING_CURRENT_FUNCTION: INDEPENDENT
TOILETING: 1
LAUNDRY ASSESSED: 1
CURRENT_FUNCTION: INDEPENDENT
HOUSEKEEPING ASSESSED: 1
PREPARING MEALS: NEEDS ASSISTANCE
DRESSING_LB_CURRENT_FUNCTION: INDEPENDENT
TELEPHONE USE ASSESSED: 1
ORAL_CARE_ASSESSED: 1
SHOPPING ASSESSED: 1
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
## 2024-02-15 ENCOUNTER — HOME CARE VISIT (OUTPATIENT)
Dept: HOME HEALTH SERVICES | Facility: HOME HEALTH | Age: 49
End: 2024-02-15
Payer: COMMERCIAL

## 2024-02-15 PROCEDURE — G0151 HHCP-SERV OF PT,EA 15 MIN: HCPCS

## 2024-02-15 ASSESSMENT — ENCOUNTER SYMPTOMS
PAIN LOCATION - PAIN FREQUENCY: CONSTANT
PAIN: 1
PAIN SEVERITY GOAL: 0/10
SUBJECTIVE PAIN PROGRESSION: GRADUALLY IMPROVING
PAIN LOCATION - RELIEVING FACTORS: REST, HEAT
LOWEST PAIN SEVERITY IN PAST 24 HOURS: 4/10
PAIN LOCATION - EXACERBATING FACTORS: ACTIVITY
PAIN LOCATION - PAIN QUALITY: ACHING
PAIN LOCATION - PAIN SEVERITY: 6/10
HIGHEST PAIN SEVERITY IN PAST 24 HOURS: 8/10
PAIN LOCATION - PAIN DURATION: CHRONIC
PERSON REPORTING PAIN: PATIENT
PAIN LOCATION: NECK

## 2024-02-20 ENCOUNTER — HOME CARE VISIT (OUTPATIENT)
Dept: HOME HEALTH SERVICES | Facility: HOME HEALTH | Age: 49
End: 2024-02-20
Payer: COMMERCIAL

## 2024-02-20 PROCEDURE — 0023 HH SOC

## 2024-02-27 ENCOUNTER — APPOINTMENT (OUTPATIENT)
Dept: HOME HEALTH SERVICES | Facility: HOME HEALTH | Age: 49
End: 2024-02-27
Payer: COMMERCIAL

## 2024-03-13 ENCOUNTER — HOME CARE VISIT (OUTPATIENT)
Dept: HOME HEALTH SERVICES | Facility: HOME HEALTH | Age: 49
End: 2024-03-13
Payer: COMMERCIAL

## 2024-03-13 ASSESSMENT — ACTIVITIES OF DAILY LIVING (ADL)
OASIS_M1830: 02
HOME_HEALTH_OASIS: 01

## 2024-03-14 NOTE — HOME HEALTH
Pt only seen by nursing on 2/13/24, a visit attempt to door was made on 2/20/24 but no answer and again a week later by phone/txt and no answer for days then pt txt to cancell visits.

## 2024-03-19 ENCOUNTER — OFFICE VISIT (OUTPATIENT)
Dept: PAIN MEDICINE | Facility: CLINIC | Age: 49
End: 2024-03-19
Payer: COMMERCIAL

## 2024-03-19 VITALS — HEART RATE: 64 BPM | RESPIRATION RATE: 16 BRPM | SYSTOLIC BLOOD PRESSURE: 113 MMHG | DIASTOLIC BLOOD PRESSURE: 79 MMHG

## 2024-03-19 DIAGNOSIS — M54.12 CERVICAL NEURITIS: Primary | ICD-10-CM

## 2024-03-19 DIAGNOSIS — M79.7 FIBROMYALGIA: ICD-10-CM

## 2024-03-19 PROCEDURE — 3078F DIAST BP <80 MM HG: CPT | Performed by: PHYSICAL MEDICINE & REHABILITATION

## 2024-03-19 PROCEDURE — 3074F SYST BP LT 130 MM HG: CPT | Performed by: PHYSICAL MEDICINE & REHABILITATION

## 2024-03-19 PROCEDURE — 1036F TOBACCO NON-USER: CPT | Performed by: PHYSICAL MEDICINE & REHABILITATION

## 2024-03-19 PROCEDURE — 99214 OFFICE O/P EST MOD 30 MIN: CPT | Performed by: PHYSICAL MEDICINE & REHABILITATION

## 2024-03-19 RX ORDER — TIZANIDINE 2 MG/1
2 TABLET ORAL EVERY 8 HOURS PRN
Qty: 90 TABLET | Refills: 2 | Status: SHIPPED | OUTPATIENT
Start: 2024-03-19 | End: 2024-06-17

## 2024-03-19 ASSESSMENT — PAIN SCALES - GENERAL: PAINLEVEL: 4

## 2024-03-19 NOTE — PROGRESS NOTES
Subjective   Patient ID: Derrick Butler is a 49 y.o. female who presents for Neck Pain    HPI    48-year-old female with history of fibromyalgia as well as neck pain with upper extremity radicular symptoms. Since our last visit, she has completed physical therapy for 5 weeks which somewhat improved symptoms, however  otherwise her symptoms are fairly similar to previous evaluation.  Topamax caused her blurry vision so this was discontinued. She continues to take Gabapentin 300 mg BID. She did not tolerate duloxetine. Patient says as before, pain is in bilateral neck with radiation to upper shoulders. She has frequent numbness and tingling in hands and sometimes in feet as well. Occasionally, she endorses shooting pains down arms.  She has also taken tizanidine and is asking for potential fill of that as this has helped her from muscle relaxer standpoint in the past.        Review of Systems     Current Outpatient Medications   Medication Instructions    ALPRAZolam (XANAX) 0.25 mg, oral, Every 12 hours    busPIRone (Buspar) 30 mg tablet 1 tablet, oral, 2 times daily    calcitriol (Rocaltrol) 0.25 mcg capsule 1 capsule, oral, Daily    calcium carbonate (CALTRATE 600 ORAL) 2 tablets, oral, Daily    cyclobenzaprine (FLEXERIL) 10 mg, oral, 3 times daily    DULoxetine (CYMBALTA) 60 mg, oral, Daily    EPINEPHrine (EPIPEN) 1 mg,  Inject once in muscular portion of leg if having severe allergic reaction    esomeprazole (NexIUM) 40 mg DR capsule 1 capsule, oral, Every 24 hours    gabapentin (Neurontin) 600 mg tablet 1 tablet, oral, Daily    HYDROcodone-acetaminophen (Norco) 5-325 mg tablet 1 tablet, oral, Every 6 hours    levothyroxine (Synthroid, Levoxyl) 200 mcg tablet 1 tablet, oral, Daily before breakfast    LORazepam (Ativan) 1 mg tablet 1.5 tablets, oral, Every 24 hours    melatonin 10 mg tablet,disintegrating 1 tablet, oral, Nightly    pantoprazole (ProtoNix) 20 mg EC tablet 1 tablet, oral, Daily    polyethylene glycol  (GLYCOLAX, MIRALAX) 17 g, oral, Daily, IN 8 OUNCES OF WATER, JUICE, OR TEA    sertraline (Zoloft) 100 mg tablet 1.5 tablets, oral, Daily    sucralfate (CARAFATE) 1 g, oral, 2 times daily before meals    topiramate (TOPAMAX) 75 mg, oral, 2 times daily    traZODone (Desyrel) 50 mg tablet 1 tablet, oral, Nightly PRN    zolpidem (Ambien) 10 mg tablet 1 tablet, oral, Nightly PRN        Past Medical History:   Diagnosis Date    Breast cancer (CMS/[facility]) 03/01/2018    Right Breast - [facility]    Chronic rhinitis 05/04/2015    Rhinosinusitis    Fibromyalgia     Hx antineoplastic chemo 2018    rt breast cancer    Personal history of gestational diabetes     History of gestational diabetes    Personal history of other diseases of the musculoskeletal system and connective tissue     Personal history of fibromyalgia    Personal history of other diseases of the nervous system and sense organs     History of migraine    Personal history of other diseases of the respiratory system 05/04/2015    History of sore throat    Personal history of other mental and behavioral disorders     History of depression    Personal history of other specified conditions     History of insomnia    Thyroid cancer (CMS/[facility]) 2013        Past Surgical History:   Procedure Laterality Date    ADENOIDECTOMY  11/10/2016    Adenoidectomy    AXILLARY NODE DISSECTION  06/20/2018    Right Axillary Dissection    BACK SURGERY  02/10/2014    Back Surgery    BI MAMMO GUIDED LOCALIZATION BREAST RIGHT Right 05/16/2018    Wire localization lumpectomy right breast with sentinel node biopsy    BI STEREOTACTIC GUIDED BREAST RIGHT LOCALIZATION AND BIOPSY Right 04/18/2018    BI STEREOTACTIC GUIDED BREAST LOCALIZATION AND BIOPSY RIGHT [facility]    BI US GUIDED BREAST LOCALIZATION AND BIOPSY LEFT Left 03/22/2018    BI US GUIDED BREAST LOCALIZATION AND BIOPSY LEFT [facility]    BLADDER SURGERY  02/10/2014    Bladder Surgery    BREAST RECONSTRUCTION Bilateral 2018     Rt breast lumpectomy, bilat reconstruction    CHOLECYSTECTOMY  02/10/2014    Cholecystectomy    COLONOSCOPY      OTHER SURGICAL HISTORY  05/07/2019    Lumpectomy    OTHER SURGICAL HISTORY  05/07/2019    Breast reconstruction    OTHER SURGICAL HISTORY  05/07/2019    Axillary lymphadenectomy    OTHER SURGICAL HISTORY  05/22/2017    Primary Repair Of Ruptured Achilles Tendon    TONSILLECTOMY  02/10/2014    Tonsillectomy    TOTAL ABDOMINAL HYSTERECTOMY W/ BILATERAL SALPINGOOPHORECTOMY  2019    TOTAL THYROIDECTOMY  11/10/2016    Thyroid Surgery Total Thyroidectomy    TUBAL LIGATION  02/10/2014    Tubal Ligation        Family History   Problem Relation Name Age of Onset    Breast cancer Mother's Sister      Breast cancer Maternal Grandmother          Allergies   Allergen Reactions    Docetaxel Shortness of breath    Meloxicam Shortness of breath, Hives and Rash    Oxycodone-Acetaminophen Hives, Shortness of breath, Itching and Swelling    Pregabalin Palpitations        Objective     There were no vitals filed for this visit.     Physical Exam    GENERAL EXAM  Vital Signs: Vital signs to include heart rate, respiration rate, blood pressure, and temperature were reviewed.  General Appearance:  Awake, alert, healthy appearing, well developed, No acute distress.  Head: Normocephalic without evidence of head injury.  Neck: The appearance of the neck was normal without swelling with a midline trachea.  Eyes: The eyelids and eyebrows exhibited no abnormalities.  Pupils were not pin-point.  Sclera was without icterus.  Lungs: Respiration rhythm and depth was normal.  Respiratory movements were normal without labored breathing.  Cardiovascular: No peripheral edema was present.    Neurological: Patient was oriented to time, place, and person.  Speech was normal.  Balance, gait, and stance were unremarkable.    Psychiatric: Appearance was normal with appropriate dress.  Mood was euthymic and affect was normal.  Skin: Affected  regions were without ecchymosis or skin lesions.    Physical exam as above except:  +tenderness to trigger points bilateral chest, upper back, neck and forehead   5/5 strength and sensation in UE bilaterally     Assessment/Plan   Diagnoses and all orders for this visit:  Cervical neuritis  -     MR cervical spine wo IV contrast; Future  -     tiZANidine (Zanaflex) 2 mg tablet; Take 1 tablet (2 mg) by mouth every 8 hours if needed for muscle spasms.  Fibromyalgia    48-year-old female with history of fibromyalgia as well as neck pain with radiation into her upper extremities. At this point since symptoms have been ongoing despite physical therapy, we can proceed with MRI imaging of cervical pain and plan for possible additional pain management via epidural steroid injections. In the meantime, will restart patient's Tizanidine which she said has helped in the past and she can continue Gabapentin.     Plan:  - MRI cervical spine  - Start Tizanidine 2mg TID  - Continue Gabapentin  - Follow up afterwards to potentially schedule for cervical epidural steroid injection    Juliet Luo MD  Anesthesiology      Patient seen and examined with attending, Dr. Vazquez who agrees with assessment and plan.          This note was generated with the aid of dictation software, there may be typos despite my attempts at proofreading.     I saw and evaluated the patient. I personally obtained the key and critical portions of the history and physical exam or was physically present for key and critical portions performed by the resident/fellow. I reviewed the resident/fellow's documentation and discussed the patient with the resident/fellow. I agree with the resident/fellow's medical decision making as documented in the note.    Geoffrey Vazquez MD

## 2024-04-02 ENCOUNTER — APPOINTMENT (OUTPATIENT)
Dept: RADIOLOGY | Facility: HOSPITAL | Age: 49
End: 2024-04-02
Payer: COMMERCIAL

## 2024-04-26 ENCOUNTER — HOSPITAL ENCOUNTER (OUTPATIENT)
Dept: RADIOLOGY | Facility: HOSPITAL | Age: 49
Discharge: HOME | End: 2024-04-26
Payer: COMMERCIAL

## 2024-04-26 ENCOUNTER — HOSPITAL ENCOUNTER (EMERGENCY)
Facility: HOSPITAL | Age: 49
Discharge: HOME | End: 2024-04-26
Payer: COMMERCIAL

## 2024-04-26 ENCOUNTER — APPOINTMENT (OUTPATIENT)
Dept: CARDIOLOGY | Facility: HOSPITAL | Age: 49
End: 2024-04-26
Payer: COMMERCIAL

## 2024-04-26 ENCOUNTER — APPOINTMENT (OUTPATIENT)
Dept: RADIOLOGY | Facility: HOSPITAL | Age: 49
End: 2024-04-26
Payer: COMMERCIAL

## 2024-04-26 VITALS
OXYGEN SATURATION: 96 % | WEIGHT: 215 LBS | HEART RATE: 80 BPM | TEMPERATURE: 98.4 F | RESPIRATION RATE: 16 BRPM | HEIGHT: 62 IN | DIASTOLIC BLOOD PRESSURE: 69 MMHG | BODY MASS INDEX: 39.56 KG/M2 | SYSTOLIC BLOOD PRESSURE: 109 MMHG

## 2024-04-26 DIAGNOSIS — M54.12 CERVICAL NEURITIS: ICD-10-CM

## 2024-04-26 DIAGNOSIS — R07.9 CHEST PAIN, UNSPECIFIED TYPE: Primary | ICD-10-CM

## 2024-04-26 LAB
ALBUMIN SERPL-MCNC: 4.2 G/DL (ref 3.5–5)
ALP BLD-CCNC: 72 U/L (ref 35–125)
ALT SERPL-CCNC: 30 U/L (ref 5–40)
ANION GAP SERPL CALC-SCNC: 12 MMOL/L
AST SERPL-CCNC: 22 U/L (ref 5–40)
BASOPHILS # BLD AUTO: 0.04 X10*3/UL (ref 0–0.1)
BASOPHILS NFR BLD AUTO: 0.9 %
BILIRUB SERPL-MCNC: 0.3 MG/DL (ref 0.1–1.2)
BUN SERPL-MCNC: 18 MG/DL (ref 8–25)
CALCIUM SERPL-MCNC: 8.9 MG/DL (ref 8.5–10.4)
CHLORIDE SERPL-SCNC: 100 MMOL/L (ref 97–107)
CO2 SERPL-SCNC: 23 MMOL/L (ref 24–31)
CREAT SERPL-MCNC: 1 MG/DL (ref 0.4–1.6)
D DIMER PPP FEU-MCNC: 0.35 MG/L FEU (ref 0.19–0.5)
EGFRCR SERPLBLD CKD-EPI 2021: 70 ML/MIN/1.73M*2
EOSINOPHIL # BLD AUTO: 0.18 X10*3/UL (ref 0–0.7)
EOSINOPHIL NFR BLD AUTO: 3.9 %
ERYTHROCYTE [DISTWIDTH] IN BLOOD BY AUTOMATED COUNT: 13 % (ref 11.5–14.5)
GLUCOSE SERPL-MCNC: 116 MG/DL (ref 65–99)
HCT VFR BLD AUTO: 36.4 % (ref 36–46)
HGB BLD-MCNC: 12.6 G/DL (ref 12–16)
IMM GRANULOCYTES # BLD AUTO: 0.01 X10*3/UL (ref 0–0.7)
IMM GRANULOCYTES NFR BLD AUTO: 0.2 % (ref 0–0.9)
LYMPHOCYTES # BLD AUTO: 2.49 X10*3/UL (ref 1.2–4.8)
LYMPHOCYTES NFR BLD AUTO: 53.3 %
MCH RBC QN AUTO: 29.6 PG (ref 26–34)
MCHC RBC AUTO-ENTMCNC: 34.6 G/DL (ref 32–36)
MCV RBC AUTO: 86 FL (ref 80–100)
MONOCYTES # BLD AUTO: 0.46 X10*3/UL (ref 0.1–1)
MONOCYTES NFR BLD AUTO: 9.9 %
NEUTROPHILS # BLD AUTO: 1.49 X10*3/UL (ref 1.2–7.7)
NEUTROPHILS NFR BLD AUTO: 31.8 %
NRBC BLD-RTO: 0 /100 WBCS (ref 0–0)
PLATELET # BLD AUTO: 270 X10*3/UL (ref 150–450)
POTASSIUM SERPL-SCNC: 4.4 MMOL/L (ref 3.4–5.1)
PROT SERPL-MCNC: 7.3 G/DL (ref 5.9–7.9)
RBC # BLD AUTO: 4.25 X10*6/UL (ref 4–5.2)
SODIUM SERPL-SCNC: 135 MMOL/L (ref 133–145)
TROPONIN T SERPL-MCNC: <6 NG/L
TROPONIN T SERPL-MCNC: <6 NG/L
WBC # BLD AUTO: 4.7 X10*3/UL (ref 4.4–11.3)

## 2024-04-26 PROCEDURE — 85025 COMPLETE CBC W/AUTO DIFF WBC: CPT | Performed by: NURSE PRACTITIONER

## 2024-04-26 PROCEDURE — 84484 ASSAY OF TROPONIN QUANT: CPT | Performed by: NURSE PRACTITIONER

## 2024-04-26 PROCEDURE — 72141 MRI NECK SPINE W/O DYE: CPT

## 2024-04-26 PROCEDURE — 72141 MRI NECK SPINE W/O DYE: CPT | Performed by: RADIOLOGY

## 2024-04-26 PROCEDURE — 71045 X-RAY EXAM CHEST 1 VIEW: CPT

## 2024-04-26 PROCEDURE — 99283 EMERGENCY DEPT VISIT LOW MDM: CPT | Mod: 25

## 2024-04-26 PROCEDURE — 36415 COLL VENOUS BLD VENIPUNCTURE: CPT | Performed by: NURSE PRACTITIONER

## 2024-04-26 PROCEDURE — 85300 ANTITHROMBIN III ACTIVITY: CPT | Performed by: NURSE PRACTITIONER

## 2024-04-26 PROCEDURE — 71045 X-RAY EXAM CHEST 1 VIEW: CPT | Performed by: RADIOLOGY

## 2024-04-26 PROCEDURE — 80053 COMPREHEN METABOLIC PANEL: CPT | Performed by: NURSE PRACTITIONER

## 2024-04-26 PROCEDURE — 93005 ELECTROCARDIOGRAM TRACING: CPT

## 2024-04-26 RX ORDER — ANASTROZOLE 1 MG/1
TABLET ORAL
COMMUNITY

## 2024-04-26 RX ORDER — ONDANSETRON 4 MG/1
TABLET, ORALLY DISINTEGRATING ORAL
COMMUNITY

## 2024-04-26 RX ORDER — ALBUTEROL SULFATE 90 UG/1
AEROSOL, METERED RESPIRATORY (INHALATION)
COMMUNITY

## 2024-04-26 RX ORDER — BENZONATATE 100 MG/1
CAPSULE ORAL
COMMUNITY

## 2024-04-26 RX ORDER — ALBUTEROL SULFATE 90 UG/1
2 AEROSOL, METERED RESPIRATORY (INHALATION) EVERY 6 HOURS PRN
COMMUNITY
Start: 2024-04-11 | End: 2024-05-11

## 2024-04-26 RX ORDER — AMOXICILLIN AND CLAVULANATE POTASSIUM 875; 125 MG/1; MG/1
1 TABLET, FILM COATED ORAL 2 TIMES DAILY
COMMUNITY

## 2024-04-26 RX ORDER — CONJUGATED ESTROGENS 0.62 MG/G
CREAM VAGINAL
COMMUNITY
Start: 2022-06-27

## 2024-04-26 RX ORDER — HYDROXYZINE HYDROCHLORIDE 50 MG/1
TABLET, FILM COATED ORAL
COMMUNITY

## 2024-04-26 RX ORDER — ISOSORBIDE MONONITRATE 30 MG/1
TABLET, EXTENDED RELEASE ORAL
COMMUNITY

## 2024-04-26 ASSESSMENT — PAIN DESCRIPTION - PROGRESSION: CLINICAL_PROGRESSION: NOT CHANGED

## 2024-04-26 ASSESSMENT — PAIN DESCRIPTION - DESCRIPTORS
DESCRIPTORS: ACHING
DESCRIPTORS: SQUEEZING;TIGHTNESS;SHARP

## 2024-04-26 ASSESSMENT — PAIN SCALES - GENERAL
PAINLEVEL_OUTOF10: 5 - MODERATE PAIN
PAINLEVEL_OUTOF10: 3

## 2024-04-26 ASSESSMENT — PAIN DESCRIPTION - ONSET: ONSET: AWAKENED FROM SLEEP

## 2024-04-26 ASSESSMENT — HEART SCORE
ECG: NORMAL
TROPONIN: LESS THAN OR EQUAL TO NORMAL LIMIT
RISK FACTORS: 1-2 RISK FACTORS
AGE: 45-64
HEART SCORE: 2
HISTORY: SLIGHTLY SUSPICIOUS

## 2024-04-26 ASSESSMENT — COLUMBIA-SUICIDE SEVERITY RATING SCALE - C-SSRS
2. HAVE YOU ACTUALLY HAD ANY THOUGHTS OF KILLING YOURSELF?: NO
6. HAVE YOU EVER DONE ANYTHING, STARTED TO DO ANYTHING, OR PREPARED TO DO ANYTHING TO END YOUR LIFE?: NO
1. IN THE PAST MONTH, HAVE YOU WISHED YOU WERE DEAD OR WISHED YOU COULD GO TO SLEEP AND NOT WAKE UP?: NO

## 2024-04-26 ASSESSMENT — PAIN DESCRIPTION - PAIN TYPE: TYPE: ACUTE PAIN

## 2024-04-26 ASSESSMENT — PAIN - FUNCTIONAL ASSESSMENT: PAIN_FUNCTIONAL_ASSESSMENT: 0-10

## 2024-04-26 ASSESSMENT — PAIN DESCRIPTION - LOCATION: LOCATION: CHEST

## 2024-04-26 ASSESSMENT — PAIN DESCRIPTION - FREQUENCY: FREQUENCY: CONSTANT/CONTINUOUS

## 2024-04-26 NOTE — ED PROVIDER NOTES
HPI   Chief Complaint   Patient presents with    Chest Pain     Brought over from mri for chest pain, states has these episodes quite often.       HPI  See my MDM                  [place] Coma Scale Score: 15   HEART Score: 2                   Patient History   Past Medical History:   Diagnosis Date    Breast cancer (Multi) 03/01/2018    Right Breast - [facility]    Chronic rhinitis 05/04/2015    Rhinosinusitis    Fibromyalgia     Hx antineoplastic chemo 2018    rt breast cancer    Personal history of gestational diabetes     History of gestational diabetes    Personal history of other diseases of the musculoskeletal system and connective tissue     Personal history of fibromyalgia    Personal history of other diseases of the nervous system and sense organs     History of migraine    Personal history of other diseases of the respiratory system 05/04/2015    History of sore throat    Personal history of other mental and behavioral disorders     History of depression    Personal history of other specified conditions     History of insomnia    Thyroid cancer (Multi) 2013     Past Surgical History:   Procedure Laterality Date    ADENOIDECTOMY  11/10/2016    Adenoidectomy    AXILLARY NODE DISSECTION  06/20/2018    Right Axillary Dissection    BACK SURGERY  02/10/2014    Back Surgery    BI MAMMO GUIDED LOCALIZATION BREAST RIGHT Right 05/16/2018    Wire localization lumpectomy right breast with sentinel node biopsy    BI STEREOTACTIC GUIDED BREAST RIGHT LOCALIZATION AND BIOPSY Right 04/18/2018    BI STEREOTACTIC GUIDED BREAST LOCALIZATION AND BIOPSY RIGHT [facility]    BI US GUIDED BREAST LOCALIZATION AND BIOPSY LEFT Left 03/22/2018    BI US GUIDED BREAST LOCALIZATION AND BIOPSY LEFT [facility]    BLADDER SURGERY  02/10/2014    Bladder Surgery    BREAST RECONSTRUCTION Bilateral 2018    Rt breast lumpectomy, bilat reconstruction    CHOLECYSTECTOMY  02/10/2014    Cholecystectomy    COLONOSCOPY      OTHER SURGICAL  HISTORY  05/07/2019    Lumpectomy    OTHER SURGICAL HISTORY  05/07/2019    Breast reconstruction    OTHER SURGICAL HISTORY  05/07/2019    Axillary lymphadenectomy    OTHER SURGICAL HISTORY  05/22/2017    Primary Repair Of Ruptured Achilles Tendon    TONSILLECTOMY  02/10/2014    Tonsillectomy    TOTAL ABDOMINAL HYSTERECTOMY W/ BILATERAL SALPINGOOPHORECTOMY  2019    TOTAL THYROIDECTOMY  11/10/2016    Thyroid Surgery Total Thyroidectomy    TUBAL LIGATION  02/10/2014    Tubal Ligation     Family History   Problem Relation Name Age of Onset    Breast cancer Mother's Sister      Breast cancer Maternal Grandmother       Social History     Tobacco Use    Smoking status: Never    Smokeless tobacco: Never   Vaping Use    Vaping status: Never Used   Substance Use Topics    Alcohol use: Yes    Drug use: Never       Physical Exam   ED Triage Vitals [04/26/24 1632]   Temperature Heart Rate Respirations BP   36.9 °C (98.4 °F) (!) 112 20 135/82      Pulse Ox Temp Source Heart Rate Source Patient Position   99 % Oral Monitor Sitting      BP Location FiO2 (%)     Right arm --       Physical Exam  CONSTITUTIONAL: Vital signs reviewed as charted, well-developed and in no distress  Eyes: Extraocular muscles are intact. Pupils equal round and reactive to light. Conjunctiva are pink.    ENT: Mucous membranes are moist. Tongue in the midline. Pharynx was without erythema or exudates, uvula midline  LUNGS: Breath sounds equal and clear to auscultation. Good air exchange, no wheezes rales or retractions, pulse oximetry is charted.  HEART: Regular rate and rhythm without murmur thrill or rub, strong tones, auscultation is normal.  ABDOMEN: Soft and nontender without guarding rebound rigidity or mass. Bowel sounds are present and normal in all quadrants. There is no palpable masses or aneurysms identified. No hepatosplenomegaly, normal abdominal exam.  Neuro: The patient is awake, alert and oriented ×3. Moving all 4 extremities and answering  questions appropriately.   MUSCULOSKELETAL: The calves are nontender to palpation. Full gross active range of motion.   PSYCH: Awake alert oriented, normal mood and affect.  Skin:  Dry, normal color, warm to the touch, no rash present.      ED Course & MDM   Diagnoses as of 04/26/24 1858   Chest pain, unspecified type       Medical Decision Making  History obtained from: patient    Vital signs, nursing notes, current medications, past medical history, Surgical history, allergies, social history, family History were reviewed.         HPI:  Patient 49-year-old female history of anxiety, depression, fibromyalgia presenting to ED today from MRI when she developed chest pain post MRI.  States she is been having intermittent episodes of chest pain for a year to a year and a half.  She has not been evaluated for this.  States feels like her heart is being squeezed.  Does hurt to palpate just left of the sternum.  She denies fever chills or night sweats.  Denies recent travel or surgeries.  Was noted to be tachycardic.      10 point ROS was reviewed and negative except Noted above in HPI.  DDX: as listed above          MDM Summary/considerations:  EMERGENCY DEPARTMENT COURSE and DIFFERENTIAL DIAGNOSIS/MDM:        The patient presented with a chief complaint of chest pain. The differential diagnosis associated with this patient's presentation includes CAD, anxiety, costochondritis, pleurisy.       Vitals:    Vitals:    04/26/24 1817 04/26/24 1818 04/26/24 1819 04/26/24 1823   BP:   115/80    BP Location:       Patient Position:       Pulse: 73 76 69 73   Resp: 12 11 19 12   Temp:       TempSrc:       SpO2: (!) 93% 96% 95% (!) 92%   Weight:       Height:           Diagnoses as of 04/26/24 1858   Chest pain, unspecified type       History Limited by:    None    Independent history obtained from:    None      External records reviewed:    None      Diagnostics interpreted by me:    EKG interpreted by my attending physician and  Xray(s) of the chest      Discussions with other clinicians:    None      Chronic conditions impacting care:    Fibromyalgia, anxiety, depression      Social determinants of health affecting care:    None    Diagnostic tests considered but not performed: none    ED Medications managed:    Medications - No data to display      Prescription drugs considered:    None    Labs Reviewed   COMPREHENSIVE METABOLIC PANEL - Abnormal       Result Value    Glucose 116 (*)     Sodium 135      Potassium 4.4      Chloride 100      Bicarbonate 23 (*)     Urea Nitrogen 18      Creatinine 1.00      eGFR 70      Calcium 8.9      Albumin 4.2      Alkaline Phosphatase 72      Total Protein 7.3      AST 22      Bilirubin, Total 0.3      ALT 30      Anion Gap 12     D-DIMER, NON VTE - Normal    D-Dimer Non VTE, Quant (mg/L FEU) 0.35      Narrative:     THROMBOEMBOLIC EVENTS CANNOT BE EXCLUDED SOLELY ON THE BASIS OF THE D-DIMER LEVEL BEING WITHIN THE NORMAL REFERENCE RANGE. D-DIMER LEVELS LESS THAN 0.5 MG/L FEU IN CONJUNCTION WITH A LOW CLINICAL PROBABILITY HAVE AN EXCELLENT NEGATIVE PREDICTIVE VALUE IN EXCLUDING A DIAGNOSIS OF PULMONARY EMBOLUS (PE) OR DEEP VEIN THROMBOSIS (DVT). ELEVATED D-DIMER LEVELS ARE NOT SPECIFIC TO PE OR DVT, AND MAY BE SEEN IN PATIENTS WITH DIC, ADVANCED AGE, PREGNANCY, MALIGNANCY, LIVER DISEASE, INFECTION, AND INFLAMMATORY CONDITIONS AMONG OTHERS. D-DIMER LEVELS MAY BE DECREASED IN PATIENTS RECEIVING ANTI-COAGULATION THERAPY.   SERIAL TROPONIN, INITIAL ([facility]) - Normal    Troponin T, High Sensitivity <6     SERIAL TROPONIN,  2 HOUR ([facility]) - Normal    Troponin T, High Sensitivity <6     TROPONIN T SERIES, HIGH SENSITIVITY (0, 2 HR, 6 HR)    Narrative:     The following orders were created for panel order Troponin T Series, High Sensitivity (0, 2HR, 6HR).  Procedure                               Abnormality         Status                     ---------                               -----------         ------                      Serial Troponin, Initial...[[id number]]  Normal              Final result               Serial Troponin, 2 Hour ...[[id number]]  Normal              Final result                 Please view results for these tests on the individual orders.   CBC WITH AUTO DIFFERENTIAL    WBC 4.7      nRBC 0.0      RBC 4.25      Hemoglobin 12.6      Hematocrit 36.4      MCV 86      MCH 29.6      MCHC 34.6      RDW 13.0      Platelets 270      Neutrophils % 31.8      Immature Granulocytes %, Automated 0.2      Lymphocytes % 53.3      Monocytes % 9.9      Eosinophils % 3.9      Basophils % 0.9      Neutrophils Absolute 1.49      Immature Granulocytes Absolute, Automated 0.01      Lymphocytes Absolute 2.49      Monocytes Absolute 0.46      Eosinophils Absolute 0.18      Basophils Absolute 0.04       XR chest 1 view   Final Result   1.  No active cardiopulmonary process.             Signed by: Jose Luis Pierre 4/26/2024 5:17 PM   Dictation workstation:   [id number]        Medications - No data to display  New Prescriptions    No medications on file     I estimate there is a low risk for pericardial tamponade, pneumothorax, pulmonary embolism, acute coronary syndrome, or thoracic aortic dissection, thus I considered the discharge disposition reasonable. We have discussed the diagnosis and risks, and we agree with discharging home to follow up with there cardiologist. We also discussed returning to the emergency department immediately if new or worsening symptoms occur. We have discussed the symptoms which are most concerning such as bloody sputum, fever, worsening pain or shortness of breath, or vomiting necessitates immediate return.    Discussed heart score with patient, Heart score 3 or less and risk of MACE of 0.9-1.7%. in the next six weeks, patient understands although low risk they're still at risk and will discuss this with their PCP and obtain further outpatient cardiac testing in the next 7 days. offered observation  patient or any family members felt uncomfortable do not feel necessary at this time.Offered observation for further monitoring evaluation and treatment if patient or any others present felt uncomfortable with plan, do not feel necessary at this time.      Patient normal troponins, nonischemic EKG, negative D-dimer, normal chest x-ray all completed here in the ED.  Currently asymptomatic.  Will be discharged home to follow with PCP 1 to 2 days for reevaluation.  Was given cardiology referral.    All of the patient's questions were answered to the best of my ability.  Patient states understanding that they have been screened for an emergency today and we have not found any etiology of symptoms that requires emergent treatment or admission to the hospital at this point. They understand that they have not had definitive care day and require follow-up for treatment of their condition. They also state understanding that they may have an emergent condition that may potentially have not of detected at this visit and they must return to the emergency department if they develop any worsening of symptoms or new complaints.      Discussed H&P with supervising physician, aware of results and agrees with plan/ disposition.        Critical Care: Not warranted at this time        This chart was completed using voice recognition transcription software. Please excuse any errors of transcription including grammatical, punctuation, syntax and spelling errors.  Please contact me with any questions regarding this chart.    Procedure  Procedures     REED Byrne-CARON  04/26/24 [id number]

## 2024-04-26 NOTE — DISCHARGE INSTRUCTIONS
Please return immediately to the emergency room if you have changing or worsening symptoms. Symptoms to be concerned about are bloody sputum, fever, worsening pain or shortness of breath, or vomiting.

## 2024-05-06 ENCOUNTER — TELEPHONE (OUTPATIENT)
Dept: PAIN MEDICINE | Facility: CLINIC | Age: 49
End: 2024-05-06
Payer: COMMERCIAL

## 2024-05-06 NOTE — TELEPHONE ENCOUNTER
Left voicemail for patient. Dr. Vazquez would like to see her in the office for a follow up visit after her cervical MRI. He is not scheduling an injection at this point. Invited patient to call central scheduling and gave her the phone #.

## 2024-05-14 DIAGNOSIS — E83.52 HYPERCALCEMIA: ICD-10-CM

## 2024-05-14 DIAGNOSIS — E83.51 HYPOCALCEMIA: Primary | ICD-10-CM

## 2024-05-17 RX ORDER — CALCITRIOL 0.5 UG/1
0.5 CAPSULE ORAL DAILY
Qty: 90 CAPSULE | Refills: 0 | Status: SHIPPED | OUTPATIENT
Start: 2024-05-17

## 2024-05-23 ENCOUNTER — APPOINTMENT (OUTPATIENT)
Dept: PAIN MEDICINE | Facility: CLINIC | Age: 49
End: 2024-05-23
Payer: COMMERCIAL

## 2024-05-23 ENCOUNTER — OFFICE VISIT (OUTPATIENT)
Dept: PAIN MEDICINE | Facility: CLINIC | Age: 49
End: 2024-05-23
Payer: COMMERCIAL

## 2024-05-23 DIAGNOSIS — M79.7 FIBROMYALGIA: Primary | ICD-10-CM

## 2024-05-23 PROCEDURE — 99214 OFFICE O/P EST MOD 30 MIN: CPT | Performed by: PHYSICAL MEDICINE & REHABILITATION

## 2024-05-23 RX ORDER — METHOCARBAMOL 500 MG/1
500 TABLET, FILM COATED ORAL 3 TIMES DAILY
Qty: 90 TABLET | Refills: 2 | Status: SHIPPED | OUTPATIENT
Start: 2024-05-23 | End: 2024-08-21

## 2024-05-23 NOTE — PROGRESS NOTES
Subjective   Patient ID: Derrick Butler is a 49 y.o. female who presents for No chief complaint on file..  HPI    49-year-old female with widespread pain no more in the neck and is here for MRI review of her neck.  Symptoms are fairly similar to previous evaluation.  She is doing physical therapy at home in the form of stretching but not doing any aerobic exercise.  We did start her on tizanidine which definitely does help but she does get pretty sleepy from that medication.                    Monitoring and compliance:    ORT:    PDUQ:    Office Agreement:      Review of Systems     Current Outpatient Medications   Medication Instructions    albuterol 90 mcg/actuation inhaler INHALE TWO PUFFS BY MOUTH AS INSTRUCTED EVERY 6 HOURS AS NEEDED FOR WHEEZING OR SHORTNESS OF BREATH.    albuterol 90 mcg/actuation inhaler 2 puffs, inhalation, Every 6 hours PRN    ALPRAZolam (XANAX) 0.25 mg, oral, Every 12 hours    amoxicillin-pot clavulanate (Augmentin) 875-125 mg tablet 1 tablet, oral, 2 times daily    anastrozole (Arimidex) 1 mg tablet     benzonatate (Tessalon) 100 mg capsule     busPIRone (Buspar) 30 mg tablet 1 tablet, oral, 2 times daily    calcitriol (ROCALTROL) 0.5 mcg, oral, Daily    calcium carbonate (CALTRATE 600 ORAL) 2 tablets, oral, Daily    cyclobenzaprine (FLEXERIL) 10 mg, oral, 3 times daily    EPINEPHrine (EPIPEN) 1 mg,  Inject once in muscular portion of leg if having severe allergic reaction    esomeprazole (NexIUM) 40 mg DR capsule 1 capsule, oral, Every 24 hours    gabapentin (Neurontin) 600 mg tablet 1 tablet, oral, Daily    HYDROcodone-acetaminophen (Norco) 5-325 mg tablet 1 tablet, oral, Every 6 hours    hydrOXYzine HCL (Atarax) 50 mg tablet     isosorbide mononitrate ER (Imdur) 30 mg 24 hr tablet     levothyroxine (Synthroid, Levoxyl) 200 mcg tablet 1 tablet, oral, Daily before breakfast    LORazepam (Ativan) 1 mg tablet 1.5 tablets, oral, Every 24 hours    melatonin 10 mg tablet,disintegrating 1  tablet, oral, Nightly    methocarbamol (ROBAXIN) 500 mg, oral, 3 times daily    ondansetron ODT (Zofran-ODT) 4 mg disintegrating tablet oral    pantoprazole (ProtoNix) 20 mg EC tablet 1 tablet, oral, Daily    polyethylene glycol (GLYCOLAX, MIRALAX) 17 g, oral, Daily, IN 8 OUNCES OF WATER, JUICE, OR TEA    Premarin vaginal cream 0.5 g Vaginal twice a week    sertraline (Zoloft) 100 mg tablet 1.5 tablets, oral, Daily    sucralfate (CARAFATE) 1 g, oral, 2 times daily before meals    tiZANidine (ZANAFLEX) 2 mg, oral, Every 8 hours PRN    traZODone (Desyrel) 50 mg tablet 1 tablet, oral, Nightly PRN    zolpidem (Ambien) 10 mg tablet 1 tablet, oral, Nightly PRN        Past Medical History:   Diagnosis Date    Breast cancer (Multi) 03/01/2018    Right Breast - [facility]    Chronic rhinitis 05/04/2015    Rhinosinusitis    Fibromyalgia     Hx antineoplastic chemo 2018    rt breast cancer    Personal history of gestational diabetes     History of gestational diabetes    Personal history of other diseases of the musculoskeletal system and connective tissue     Personal history of fibromyalgia    Personal history of other diseases of the nervous system and sense organs     History of migraine    Personal history of other diseases of the respiratory system 05/04/2015    History of sore throat    Personal history of other mental and behavioral disorders     History of depression    Personal history of other specified conditions     History of insomnia    Thyroid cancer (Multi) 2013        Past Surgical History:   Procedure Laterality Date    ADENOIDECTOMY  11/10/2016    Adenoidectomy    AXILLARY NODE DISSECTION  06/20/2018    Right Axillary Dissection    BACK SURGERY  02/10/2014    Back Surgery    BI MAMMO GUIDED LOCALIZATION BREAST RIGHT Right 05/16/2018    Wire localization lumpectomy right breast with sentinel node biopsy    BI STEREOTACTIC GUIDED BREAST RIGHT LOCALIZATION AND BIOPSY Right 04/18/2018    BI STEREOTACTIC GUIDED BREAST  LOCALIZATION AND BIOPSY RIGHT [facility]    BI US GUIDED BREAST LOCALIZATION AND BIOPSY LEFT Left 03/22/2018    BI US GUIDED BREAST LOCALIZATION AND BIOPSY LEFT [facility]    BLADDER SURGERY  02/10/2014    Bladder Surgery    BREAST RECONSTRUCTION Bilateral 2018    Rt breast lumpectomy, bilat reconstruction    CHOLECYSTECTOMY  02/10/2014    Cholecystectomy    COLONOSCOPY      OTHER SURGICAL HISTORY  05/07/2019    Lumpectomy    OTHER SURGICAL HISTORY  05/07/2019    Breast reconstruction    OTHER SURGICAL HISTORY  05/07/2019    Axillary lymphadenectomy    OTHER SURGICAL HISTORY  05/22/2017    Primary Repair Of Ruptured Achilles Tendon    TONSILLECTOMY  02/10/2014    Tonsillectomy    TOTAL ABDOMINAL HYSTERECTOMY W/ BILATERAL SALPINGOOPHORECTOMY  2019    TOTAL THYROIDECTOMY  11/10/2016    Thyroid Surgery Total Thyroidectomy    TUBAL LIGATION  02/10/2014    Tubal Ligation        Family History   Problem Relation Name Age of Onset    Breast cancer Mother's Sister      Breast cancer Maternal Grandmother          Allergies   Allergen Reactions    Docetaxel Shortness of breath    Meloxicam Shortness of breath, Hives and Rash    Oxycodone-Acetaminophen Hives, Shortness of breath, Itching and Swelling    Pregabalin Palpitations        Objective     There were no vitals filed for this visit.     Physical Exam    GENERAL EXAM  Vital Signs: Vital signs to include heart rate, respiration rate, blood pressure, and temperature were reviewed.  General Appearance:  Awake, alert, healthy appearing, well developed, No acute distress.  Head: Normocephalic without evidence of head injury.  Neck: The appearance of the neck was normal without swelling with a midline trachea.  Eyes: The eyelids and eyebrows exhibited no abnormalities.  Pupils were not pin-point.  Sclera was without icterus.  Lungs: Respiration rhythm and depth was normal.  Respiratory movements were normal without labored breathing.  Cardiovascular: No  peripheral edema was present.    Neurological: Patient was oriented to time, place, and person.  Speech was normal.  Balance, gait, and stance were unremarkable.    Psychiatric: Appearance was normal with appropriate dress.  Mood was euthymic and affect was normal.  Skin: Affected regions were without ecchymosis or skin lesions.        Physical exam as above except:  Palpation over cervical paraspinals as well as multiple tender points throughout her entire thorax.      Assessment/Plan   Diagnoses and all orders for this visit:  Fibromyalgia  -     methocarbamol (Robaxin) 500 mg tablet; Take 1 tablet (500 mg) by mouth 3 times a day.    49-year-old female with widespread musculoskeletal pain more consistent with fibromyalgia/myofascial pain.  We did review her MRI which thankfully did not show any degeneration or nerve compromise.  She has had difficulty tolerating some of the medications and she is getting improvement with the tizanidine but having more drowsiness from the tizanidine.  It is reasonable to trial some  methocarbamol as this may cause some less drowsiness.  Plan for today:  - Patient may continue with tizanidine at night but we will add methocarbamol 500 mg 3 times daily to see if she gets less sedating side effects from this medication.  - The patient was explained that the mainstay of managing fibromyalgia/myofascial pain is to participate in low-impact aerobic exercising for 1 hour day, 6 days a week. Examples of low impact aerobic exercising include swimming, riding a stationary bicycle or exercising on an elliptical machine. Low-impact aerobic exercising at that rate results in much better pain relief than any of the medications that could be prescribed for fibromyalgia/myofascial pain and without side effects. The patient must be compliant, however, and must participate in such therapy for 8 weeks consecutively before noticing a remarkable response. It was explained to the patient that one could  titrate up from a smaller duration of exercising by increasing the time spent doing the low-impact aerobic exercise in small increments such as 1 minute every day until meeting the 60 minutes a day, 6 days a week goal. The patient was receptive to the counseling and agrees to continue to exercise toward this goal.       This note was generated with the aid of dictation software, there may be typos despite my attempts at proofreading.

## 2024-06-19 LAB
ATRIAL RATE: 102 BPM
P AXIS: 45 DEGREES
P OFFSET: 195 MS
P ONSET: 157 MS
PR INTERVAL: 134 MS
Q ONSET: 224 MS
QRS COUNT: 17 BEATS
QRS DURATION: 60 MS
QT INTERVAL: 346 MS
QTC CALCULATION(BAZETT): 450 MS
QTC FREDERICIA: 412 MS
R AXIS: 30 DEGREES
T AXIS: 60 DEGREES
T OFFSET: 397 MS
VENTRICULAR RATE: 102 BPM

## 2024-06-20 DIAGNOSIS — M54.12 CERVICAL NEURITIS: ICD-10-CM

## 2024-06-21 RX ORDER — TIZANIDINE 2 MG/1
2 TABLET ORAL EVERY 8 HOURS PRN
Qty: 90 TABLET | Refills: 0 | OUTPATIENT
Start: 2024-06-21

## 2024-07-14 DIAGNOSIS — E03.9 HYPOTHYROIDISM, UNSPECIFIED TYPE: Primary | ICD-10-CM

## 2024-07-14 RX ORDER — LEVOTHYROXINE SODIUM 112 UG/1
TABLET ORAL
Qty: 180 TABLET | Refills: 0 | Status: SHIPPED | OUTPATIENT
Start: 2024-07-14

## 2024-08-20 DIAGNOSIS — E83.51 HYPOCALCEMIA: ICD-10-CM

## 2024-08-21 RX ORDER — CALCITRIOL 0.5 UG/1
0.5 CAPSULE ORAL DAILY
Qty: 90 CAPSULE | Refills: 1 | Status: SHIPPED | OUTPATIENT
Start: 2024-08-21

## 2024-08-26 DIAGNOSIS — M79.7 FIBROMYALGIA: ICD-10-CM

## 2024-08-26 RX ORDER — METHOCARBAMOL 500 MG/1
500 TABLET, FILM COATED ORAL 4 TIMES DAILY
Qty: 120 TABLET | Refills: 2 | Status: SHIPPED | OUTPATIENT
Start: 2024-08-26 | End: 2024-11-24

## 2024-08-26 NOTE — TELEPHONE ENCOUNTER
Patient requesting methocarbamol refill. Would like 500mg 4 times a day if possible. She says it is helping her better than the Tizanidine did.

## 2024-09-06 ENCOUNTER — APPOINTMENT (OUTPATIENT)
Dept: ENDOCRINOLOGY | Facility: CLINIC | Age: 49
End: 2024-09-06
Payer: COMMERCIAL

## 2024-10-28 DIAGNOSIS — E03.9 HYPOTHYROIDISM, UNSPECIFIED TYPE: ICD-10-CM

## 2024-10-29 RX ORDER — LEVOTHYROXINE SODIUM 112 UG/1
TABLET ORAL
Qty: 180 TABLET | Refills: 0 | Status: SHIPPED | OUTPATIENT
Start: 2024-10-29

## 2024-11-04 ENCOUNTER — LAB (OUTPATIENT)
Dept: LAB | Facility: LAB | Age: 49
End: 2024-11-04
Payer: COMMERCIAL

## 2024-11-04 ENCOUNTER — APPOINTMENT (OUTPATIENT)
Dept: ENDOCRINOLOGY | Facility: CLINIC | Age: 49
End: 2024-11-04
Payer: COMMERCIAL

## 2024-11-04 VITALS
BODY MASS INDEX: 40.79 KG/M2 | WEIGHT: 223 LBS | SYSTOLIC BLOOD PRESSURE: 106 MMHG | HEART RATE: 89 BPM | DIASTOLIC BLOOD PRESSURE: 74 MMHG

## 2024-11-04 DIAGNOSIS — E89.2 POSTSURGICAL HYPOPARATHYROIDISM (MULTI): ICD-10-CM

## 2024-11-04 DIAGNOSIS — C73 MALIGNANT TUMOR OF THYROID GLAND (MULTI): ICD-10-CM

## 2024-11-04 DIAGNOSIS — C73 MALIGNANT TUMOR OF THYROID GLAND (MULTI): Primary | ICD-10-CM

## 2024-11-04 LAB
ANION GAP SERPL CALC-SCNC: 15 MMOL/L (ref 10–20)
BUN SERPL-MCNC: 21 MG/DL (ref 6–23)
CALCIUM SERPL-MCNC: 8.6 MG/DL (ref 8.6–10.3)
CHLORIDE SERPL-SCNC: 102 MMOL/L (ref 98–107)
CO2 SERPL-SCNC: 24 MMOL/L (ref 21–32)
CREAT SERPL-MCNC: 1.05 MG/DL (ref 0.5–1.05)
EGFRCR SERPLBLD CKD-EPI 2021: 66 ML/MIN/1.73M*2
GLUCOSE SERPL-MCNC: 130 MG/DL (ref 74–99)
POTASSIUM SERPL-SCNC: 4.3 MMOL/L (ref 3.5–5.3)
SODIUM SERPL-SCNC: 137 MMOL/L (ref 136–145)
TSH SERPL-ACNC: 0.65 MIU/L (ref 0.44–3.98)

## 2024-11-04 PROCEDURE — 84432 ASSAY OF THYROGLOBULIN: CPT

## 2024-11-04 PROCEDURE — 82306 VITAMIN D 25 HYDROXY: CPT

## 2024-11-04 PROCEDURE — 99214 OFFICE O/P EST MOD 30 MIN: CPT | Performed by: INTERNAL MEDICINE

## 2024-11-04 PROCEDURE — 3074F SYST BP LT 130 MM HG: CPT | Performed by: INTERNAL MEDICINE

## 2024-11-04 PROCEDURE — 83970 ASSAY OF PARATHORMONE: CPT

## 2024-11-04 PROCEDURE — 36415 COLL VENOUS BLD VENIPUNCTURE: CPT

## 2024-11-04 PROCEDURE — 86800 THYROGLOBULIN ANTIBODY: CPT

## 2024-11-04 PROCEDURE — 80048 BASIC METABOLIC PNL TOTAL CA: CPT

## 2024-11-04 PROCEDURE — 84443 ASSAY THYROID STIM HORMONE: CPT

## 2024-11-04 PROCEDURE — 3078F DIAST BP <80 MM HG: CPT | Performed by: INTERNAL MEDICINE

## 2024-11-04 PROCEDURE — 1036F TOBACCO NON-USER: CPT | Performed by: INTERNAL MEDICINE

## 2024-11-04 ASSESSMENT — ENCOUNTER SYMPTOMS
CONSTIPATION: 1
NAUSEA: 1
MYALGIAS: 1
NUMBNESS: 1
WEAKNESS: 1
DIZZINESS: 1
UNEXPECTED WEIGHT CHANGE: 1
PALPITATIONS: 1
FATIGUE: 1
NECK PAIN: 1
ABDOMINAL PAIN: 1
APNEA: 1
ARTHRALGIAS: 1
BACK PAIN: 1

## 2024-11-04 NOTE — PROGRESS NOTES
History Of Present Illness  Derrick Butler is a 49 y.o. female with a history of thyroid cancer.     Levothyroxine 224 mcg/day  Patient is taking levothyroxine on an empty stomach with water alone.    Thyroid cancer diagnosis date: 8/20/13   Type: Papillary  Size of primary tumor: 3.0 cm left lobe, 0.3 cm right lobe  Lymph Nodes: 0/5  Distant Metastases: None known  Pathologic Staging: pT2 N0 Mx. Clinical Stage: I     Surgery: Thyroidectomy with lymph node dissection (10/9/13)      Radioiodine Therapy (1/8/14): 90 mCi, Thyrogen stimulated   Post-Therapy 131-I WBS: Thyroid remnant uptake     Hypoparathyroidism. Discovered in August 2014.   Calcitriol 0.5 mcg/day   Caltrate+D 600 mg once daily       Past Medical History  She has a past medical history of Breast cancer (Multi) (03/01/2018), Chronic rhinitis (05/04/2015), Fibromyalgia, antineoplastic chemo (2018), Personal history of gestational diabetes, Personal history of other diseases of the musculoskeletal system and connective tissue, Personal history of other diseases of the nervous system and sense organs, Personal history of other diseases of the respiratory system (05/04/2015), Personal history of other mental and behavioral disorders, Personal history of other specified conditions, and Thyroid cancer (Multi) (2013).    Surgical History  She has a past surgical history that includes Back surgery (02/10/2014); Tubal ligation (02/10/2014); Cholecystectomy (02/10/2014); Tonsillectomy (02/10/2014); Bladder surgery (02/10/2014); Other surgical history (05/07/2019); Other surgical history (05/07/2019); Other surgical history (05/07/2019); Other surgical history (05/22/2017); Adenoidectomy (11/10/2016); Total thyroidectomy (11/10/2016); BI stereotactic guided breast right localization and biopsy (Right, 04/18/2018); BI mammo guided breast right localization (Right, 05/16/2018); BI US guided breast localization and biopsy left (Left, 03/22/2018); Axillary node  dissection (06/20/2018); Colonoscopy; Total abdominal hysterectomy w/ bilateral salpingoophorectomy (2019); and Breast reconstruction (Bilateral, 2018).     Social History  She reports that she has never smoked. She has never used smokeless tobacco. She reports current alcohol use. She reports that she does not use drugs.    Family History  Family History   Problem Relation Name Age of Onset    Breast cancer Mother's Sister      Breast cancer Maternal Grandmother         Medications  Current Outpatient Medications   Medication Instructions    albuterol 90 mcg/actuation inhaler INHALE TWO PUFFS BY MOUTH AS INSTRUCTED EVERY 6 HOURS AS NEEDED FOR WHEEZING OR SHORTNESS OF BREATH.    albuterol 90 mcg/actuation inhaler 2 puffs, inhalation, Every 6 hours PRN    busPIRone (Buspar) 30 mg tablet 1 tablet, 2 times daily    calcitriol (ROCALTROL) 0.5 mcg, oral, Daily    calcium carbonate (CALTRATE 600 ORAL) 2 tablets, Daily    gabapentin (Neurontin) 600 mg tablet 1 tablet, Daily    levothyroxine (Synthroid, Levoxyl) 112 mcg tablet TAKE 2 TABLETS BY MOUTH ONCE DAILY ON AN EMPTY STOMACH    LORazepam (Ativan) 1 mg tablet 1.5 tablets, Every 24 hours    melatonin 10 mg tablet,disintegrating 1 tablet, Nightly    methocarbamol (ROBAXIN) 500 mg, oral, 4 times daily    ondansetron ODT (Zofran-ODT) 4 mg disintegrating tablet Take by mouth.    pantoprazole (ProtoNix) 20 mg EC tablet 1 tablet, Daily    polyethylene glycol (GLYCOLAX, MIRALAX) 17 g, Daily    sertraline (Zoloft) 100 mg tablet 1.5 tablets, Daily    tiZANidine (ZANAFLEX) 2 mg, oral, Every 8 hours PRN    traZODone (Desyrel) 50 mg tablet 1 tablet, Nightly PRN    zolpidem (Ambien) 10 mg tablet 1 tablet, Nightly PRN       Allergies  Docetaxel, Meloxicam, Oxycodone-acetaminophen, and Pregabalin    Review of Systems   Constitutional:  Positive for fatigue and unexpected weight change.   HENT:  Positive for hearing loss.    Eyes:  Positive for visual disturbance.   Respiratory:   Positive for apnea (snoring).    Cardiovascular:  Positive for palpitations.   Gastrointestinal:  Positive for abdominal pain, constipation and nausea.   Musculoskeletal:  Positive for arthralgias, back pain, myalgias and neck pain.   Neurological:  Positive for dizziness, weakness and numbness.         Last Recorded Vitals  Blood pressure 106/74, pulse 89, weight 101 kg (223 lb).    Physical Exam  Constitutional:       General: She is not in acute distress.  HENT:      Head: Normocephalic.      Mouth/Throat:      Mouth: Mucous membranes are moist.   Eyes:      Extraocular Movements: Extraocular movements intact.   Neck:      Comments: Anterior neck scar, no palpable thyroid  Cardiovascular:      Pulses:           Radial pulses are 2+ on the right side and 2+ on the left side.   Musculoskeletal:      Right lower leg: No edema.      Left lower leg: No edema.   Lymphadenopathy:      Cervical: No cervical adenopathy.   Neurological:      Mental Status: She is alert.      Motor: No tremor.   Psychiatric:         Mood and Affect: Affect normal.          Relevant Results  Lab Results   Component Value Date    TSH 0.79 04/12/2023    FREET4 0.8 (L) 08/17/2021    THYROGLOBU <0.1 (L) 04/06/2022    THYROGLCMSMS Not Applicable 04/06/2022    THYROGLOBULI <0.9 04/06/2022      Latest Reference Range & Units 04/26/24 16:44   GLUCOSE 65 - 99 mg/dL 116 (H)   SODIUM 133 - 145 mmol/L 135   POTASSIUM 3.4 - 5.1 mmol/L 4.4   CHLORIDE 97 - 107 mmol/L 100   Bicarbonate 24 - 31 mmol/L 23 (L)   Anion Gap <=19 mmol/L 12   Blood Urea Nitrogen 8 - 25 mg/dL 18   Creatinine 0.40 - 1.60 mg/dL 1.00   EGFR >60 mL/min/1.73m*2 70   Calcium 8.5 - 10.4 mg/dL 8.9     Ref Range & Units 1 mo ago Comments   TSH  0.270 - 4.200 mIU/L 1.690 If the patient is pregnant, TSH reference range varies by gestational period:  First Trimester (weeks 9-12): 0.180-2.990 mIU/L  Second Trimester: 0.110-3.980 mIU/L  Third Trimester: 0.480-4.710 mIU/L    Tj KWONG et al. A  Practical Approach for the Verifications and Determination of Site- and Trimester-Specific Reference Intervals for Thyroid Function tests in Pregnancy. Thyroid, 2019:29:3:412-420. [place] WILLIAMSON, et al. 2017 Guidelines of the American Thyroid Association for the Diagnosis and Management of Thyroid Disease during Pregnancy and the Postpartum. Thyroid, 2017:27:3:315-389.   Resulting Agency [facility]      Specimen Collected: 09/13/24 13:14         IMPRESSION  PAPILLARY THYROID CARCINOMA  Thyroidectomy 11 years ago  No current labs    HYPOPARATHYROIDISM  HYPOCALCEMIA  Last serum calcium normal  24h urine calcium normal last year      RECOMMENDATIONS  Draw TSH, thyroglobulin, PTH, vitamin D, BMP  24h urine calcium  Results available on [facility]  Call/message if you have not received results in 7 days.     Take levothyroxine on an empty stomach with water alone, 1 hour before eating or taking other medications, 4 hours before any calcium or iron supplement.    Follow up 1 year if no change in program.

## 2024-11-04 NOTE — LETTER
November 4, 2024     aHylee De La Torre PA-C  [address] [facility] [address]    Patient: Derrick Butler   YOB: 1975   Date of Visit: 11/4/2024       Dear Dr. Haylee De La Torre PA-C:    Thank you for referring Derrick Butler to me for evaluation. Below are my notes for this consultation.  If you have questions, please do not hesitate to call me. I look forward to following your patient along with you.       Sincerely,     Lucho Ji MD      CC: No Recipients  ______________________________________________________________________________________    History Of Present Illness  Derrick Butler is a 49 y.o. female with a history of thyroid cancer.     Levothyroxine 224 mcg/day  Patient is taking levothyroxine on an empty stomach with water alone.    Thyroid cancer diagnosis date: 8/20/13   Type: Papillary  Size of primary tumor: 3.0 cm left lobe, 0.3 cm right lobe  Lymph Nodes: 0/5  Distant Metastases: None known  Pathologic Staging: pT2 N0 Mx. Clinical Stage: I     Surgery: Thyroidectomy with lymph node dissection (10/9/13)      Radioiodine Therapy (1/8/14): 90 mCi, Thyrogen stimulated   Post-Therapy 131-I WBS: Thyroid remnant uptake     Hypoparathyroidism. Discovered in August 2014.   Calcitriol 0.5 mcg/day   Caltrate+D 600 mg once daily       Past Medical History  She has a past medical history of Breast cancer (Multi) (03/01/2018), Chronic rhinitis (05/04/2015), Fibromyalgia, antineoplastic chemo (2018), Personal history of gestational diabetes, Personal history of other diseases of the musculoskeletal system and connective tissue, Personal history of other diseases of the nervous system and sense organs, Personal history of other diseases of the respiratory system (05/04/2015), Personal history of other mental and behavioral disorders, Personal history of other specified conditions, and Thyroid cancer (Multi) (2013).    Surgical History  She has a past surgical history that includes Back  surgery (02/10/2014); Tubal ligation (02/10/2014); Cholecystectomy (02/10/2014); Tonsillectomy (02/10/2014); Bladder surgery (02/10/2014); Other surgical history (05/07/2019); Other surgical history (05/07/2019); Other surgical history (05/07/2019); Other surgical history (05/22/2017); Adenoidectomy (11/10/2016); Total thyroidectomy (11/10/2016); BI stereotactic guided breast right localization and biopsy (Right, 04/18/2018); BI mammo guided breast right localization (Right, 05/16/2018); BI US guided breast localization and biopsy left (Left, 03/22/2018); Axillary node dissection (06/20/2018); Colonoscopy; Total abdominal hysterectomy w/ bilateral salpingoophorectomy (2019); and Breast reconstruction (Bilateral, 2018).     Social History  She reports that she has never smoked. She has never used smokeless tobacco. She reports current alcohol use. She reports that she does not use drugs.    Family History  Family History   Problem Relation Name Age of Onset   • Breast cancer Mother's Sister     • Breast cancer Maternal Grandmother         Medications  Current Outpatient Medications   Medication Instructions   • albuterol 90 mcg/actuation inhaler INHALE TWO PUFFS BY MOUTH AS INSTRUCTED EVERY 6 HOURS AS NEEDED FOR WHEEZING OR SHORTNESS OF BREATH.   • albuterol 90 mcg/actuation inhaler 2 puffs, inhalation, Every 6 hours PRN   • busPIRone (Buspar) 30 mg tablet 1 tablet, 2 times daily   • calcitriol (ROCALTROL) 0.5 mcg, oral, Daily   • calcium carbonate (CALTRATE 600 ORAL) 2 tablets, Daily   • gabapentin (Neurontin) 600 mg tablet 1 tablet, Daily   • levothyroxine (Synthroid, Levoxyl) 112 mcg tablet TAKE 2 TABLETS BY MOUTH ONCE DAILY ON AN EMPTY STOMACH   • LORazepam (Ativan) 1 mg tablet 1.5 tablets, Every 24 hours   • melatonin 10 mg tablet,disintegrating 1 tablet, Nightly   • methocarbamol (ROBAXIN) 500 mg, oral, 4 times daily   • ondansetron ODT (Zofran-ODT) 4 mg disintegrating tablet Take by mouth.   • pantoprazole  (ProtoNix) 20 mg EC tablet 1 tablet, Daily   • polyethylene glycol (GLYCOLAX, MIRALAX) 17 g, Daily   • sertraline (Zoloft) 100 mg tablet 1.5 tablets, Daily   • tiZANidine (ZANAFLEX) 2 mg, oral, Every 8 hours PRN   • traZODone (Desyrel) 50 mg tablet 1 tablet, Nightly PRN   • zolpidem (Ambien) 10 mg tablet 1 tablet, Nightly PRN       Allergies  Docetaxel, Meloxicam, Oxycodone-acetaminophen, and Pregabalin    Review of Systems   Constitutional:  Positive for fatigue and unexpected weight change.   HENT:  Positive for hearing loss.    Eyes:  Positive for visual disturbance.   Respiratory:  Positive for apnea (snoring).    Cardiovascular:  Positive for palpitations.   Gastrointestinal:  Positive for abdominal pain, constipation and nausea.   Musculoskeletal:  Positive for arthralgias, back pain, myalgias and neck pain.   Neurological:  Positive for dizziness, weakness and numbness.         Last Recorded Vitals  Blood pressure 106/74, pulse 89, weight 101 kg (223 lb).    Physical Exam  Constitutional:       General: She is not in acute distress.  HENT:      Head: Normocephalic.      Mouth/Throat:      Mouth: Mucous membranes are moist.   Eyes:      Extraocular Movements: Extraocular movements intact.   Neck:      Comments: Anterior neck scar, no palpable thyroid  Cardiovascular:      Pulses:           Radial pulses are 2+ on the right side and 2+ on the left side.   Musculoskeletal:      Right lower leg: No edema.      Left lower leg: No edema.   Lymphadenopathy:      Cervical: No cervical adenopathy.   Neurological:      Mental Status: She is alert.      Motor: No tremor.   Psychiatric:         Mood and Affect: Affect normal.          Relevant Results  Lab Results   Component Value Date    TSH 0.79 04/12/2023    FREET4 0.8 (L) 08/17/2021    THYROGLOBU <0.1 (L) 04/06/2022    THYROGLCMSMS Not Applicable 04/06/2022    THYROGLOBULI <0.9 04/06/2022      Latest Reference Range & Units 04/26/24 16:44   GLUCOSE 65 - 99 mg/dL 116  (H)   SODIUM 133 - 145 mmol/L 135   POTASSIUM 3.4 - 5.1 mmol/L 4.4   CHLORIDE 97 - 107 mmol/L 100   Bicarbonate 24 - 31 mmol/L 23 (L)   Anion Gap <=19 mmol/L 12   Blood Urea Nitrogen 8 - 25 mg/dL 18   Creatinine 0.40 - 1.60 mg/dL 1.00   EGFR >60 mL/min/1.73m*2 70   Calcium 8.5 - 10.4 mg/dL 8.9     Ref Range & Units 1 mo ago Comments   TSH  0.270 - 4.200 mIU/L 1.690 If the patient is pregnant, TSH reference range varies by gestational period:  First Trimester (weeks 9-12): 0.180-2.990 mIU/L  Second Trimester: 0.110-3.980 mIU/L  Third Trimester: 0.480-4.710 mIU/L    Tj KWONG et al. A Practical Approach for the Verifications and Determination of Site- and Trimester-Specific Reference Intervals for Thyroid Function tests in Pregnancy. Thyroid, 2019:29:3:412-420. [place] WILLIAMSON et al. 2017 Guidelines of the American Thyroid Association for the Diagnosis and Management of Thyroid Disease during Pregnancy and the Postpartum. Thyroid, 2017:27:3:315-389.   Resulting Agency [facility]      Specimen Collected: 09/13/24 13:14         IMPRESSION  PAPILLARY THYROID CARCINOMA  Thyroidectomy 11 years ago  No current labs    HYPOPARATHYROIDISM  HYPOCALCEMIA  Last serum calcium normal  24h urine calcium normal last year      RECOMMENDATIONS  Draw TSH, thyroglobulin, PTH, vitamin D, BMP  24h urine calcium  Results available on [facility]  Call/message if you have not received results in 7 days.     Take levothyroxine on an empty stomach with water alone, 1 hour before eating or taking other medications, 4 hours before any calcium or iron supplement.    Follow up 1 year if no change in program.

## 2024-11-04 NOTE — PATIENT INSTRUCTIONS
RECOMMENDATIONS  Draw TSH, thyroglobulin, PTH, vitamin D, BMP  24h urine calcium  Results available on [facility]  Call/message if you have not received results in 7 days.     Take levothyroxine on an empty stomach with water alone, 1 hour before eating or taking other medications, 4 hours before any calcium or iron supplement.    Follow up 1 year if no change in program.

## 2024-11-05 LAB
25(OH)D3 SERPL-MCNC: 39 NG/ML (ref 30–100)
PTH-INTACT SERPL-MCNC: <6.3 PG/ML (ref 18.5–88)

## 2024-11-06 LAB
BILL ONLY-THYROGLOBULIN: NORMAL
THYROGLOB AB SERPL-ACNC: <0.9 IU/ML (ref 0–4)
THYROGLOB SERPL-MCNC: 0.1 NG/ML (ref 1.3–31.8)
THYROGLOB SERPL-MCNC: ABNORMAL NG/ML (ref 1.3–31.8)

## 2024-11-27 ENCOUNTER — LAB (OUTPATIENT)
Dept: LAB | Facility: LAB | Age: 49
End: 2024-11-27
Payer: COMMERCIAL

## 2024-11-27 DIAGNOSIS — E89.2 POSTSURGICAL HYPOPARATHYROIDISM (MULTI): ICD-10-CM

## 2024-11-27 PROCEDURE — 81050 URINALYSIS VOLUME MEASURE: CPT

## 2024-11-27 PROCEDURE — 82570 ASSAY OF URINE CREATININE: CPT

## 2024-11-27 PROCEDURE — 82340 ASSAY OF CALCIUM IN URINE: CPT

## 2024-11-28 LAB
CALCIUM (MG/L) IN 24 HOUR URINE: 154 MG/24H (ref 100–300)
CALCIUM 24H UR-MRATE: 8.3 MG/DL
COLLECT DURATION TIME SPEC: 24 HRS
COLLECT DURATION TIME SPEC: 24 HRS
CREAT 24H UR-MCNC: 50.8 MG/DL (ref 20–320)
CREAT 24H UR-MCNC: 50.8 MG/DL (ref 20–320)
CREAT 24H UR-MRATE: 0.94 G/24 H (ref 0.67–1.59)
CREAT 24H UR-MRATE: 0.94 G/24 H (ref 0.67–1.59)
SPECIMEN VOL 24H UR: 1850 ML
SPECIMEN VOL 24H UR: 1850 ML

## 2024-12-04 DIAGNOSIS — M79.7 FIBROMYALGIA: ICD-10-CM

## 2024-12-12 DIAGNOSIS — M79.7 FIBROMYALGIA: ICD-10-CM

## 2024-12-12 RX ORDER — METHOCARBAMOL 500 MG/1
500 TABLET, FILM COATED ORAL 4 TIMES DAILY
Qty: 120 TABLET | Refills: 3 | Status: SHIPPED | OUTPATIENT
Start: 2024-12-12 | End: 2025-04-11

## 2024-12-12 RX ORDER — METHOCARBAMOL 500 MG/1
500 TABLET, FILM COATED ORAL 4 TIMES DAILY
Qty: 120 TABLET | Refills: 0 | OUTPATIENT
Start: 2024-12-12

## 2024-12-17 ENCOUNTER — OFFICE VISIT (OUTPATIENT)
Dept: GASTROENTEROLOGY | Facility: CLINIC | Age: 49
End: 2024-12-17
Payer: COMMERCIAL

## 2024-12-17 VITALS
DIASTOLIC BLOOD PRESSURE: 72 MMHG | SYSTOLIC BLOOD PRESSURE: 102 MMHG | HEART RATE: 92 BPM | WEIGHT: 226 LBS | TEMPERATURE: 98.2 F | BODY MASS INDEX: 41.59 KG/M2 | HEIGHT: 62 IN

## 2024-12-17 DIAGNOSIS — R10.33 PERIUMBILICAL ABDOMINAL PAIN: ICD-10-CM

## 2024-12-17 DIAGNOSIS — R14.0 ABDOMINAL BLOATING: ICD-10-CM

## 2024-12-17 DIAGNOSIS — K59.9 MALDIGESTION SYNDROME: Primary | ICD-10-CM

## 2024-12-17 DIAGNOSIS — D12.6 TUBULAR ADENOMA OF COLON: ICD-10-CM

## 2024-12-17 PROCEDURE — 99212 OFFICE O/P EST SF 10 MIN: CPT | Performed by: INTERNAL MEDICINE

## 2024-12-17 PROCEDURE — 99202 OFFICE O/P NEW SF 15 MIN: CPT | Performed by: INTERNAL MEDICINE

## 2024-12-17 PROCEDURE — 3074F SYST BP LT 130 MM HG: CPT | Performed by: INTERNAL MEDICINE

## 2024-12-17 PROCEDURE — 3008F BODY MASS INDEX DOCD: CPT | Performed by: INTERNAL MEDICINE

## 2024-12-17 PROCEDURE — 3078F DIAST BP <80 MM HG: CPT | Performed by: INTERNAL MEDICINE

## 2024-12-17 PROCEDURE — 1036F TOBACCO NON-USER: CPT | Performed by: INTERNAL MEDICINE

## 2024-12-17 RX ORDER — METFORMIN HYDROCHLORIDE 500 MG/1
500 TABLET, EXTENDED RELEASE ORAL
COMMUNITY
Start: 2024-11-16

## 2024-12-17 RX ORDER — SODIUM PICOSULFATE, MAGNESIUM OXIDE, AND ANHYDROUS CITRIC ACID 10; 3.5; 12 MG/160ML; G/160ML; G/160ML
1 LIQUID ORAL ONCE
Qty: 160 ML | Refills: 0 | Status: SHIPPED | OUTPATIENT
Start: 2024-12-17 | End: 2024-12-17

## 2024-12-17 ASSESSMENT — PAIN SCALES - GENERAL: PAINLEVEL_OUTOF10: 0-NO PAIN

## 2024-12-17 NOTE — PROGRESS NOTES
Subjective   Patient ID: Derrick Butler is a 49 y.o. female.    Here in follow up to prior visits in 2021 and before that was possibly a motility disorder; symptoms present possibly for 15 years     2019 she had multiple admissions for abdominal pain hospitalized at [facility] with last EGD 2019    Abdominal pain with triggers like white wine, sauces, spices cause upper abdominal pain    Calorie intake is low but weight loss has not followed   Boost is better than solids but still has early satiety     Fibromyalgia and intolerant to Tylenol  IBS-C but occasional diarrhea after fast foods    Breast cancer treated with lumpectomy and chemotherapy 2018 with Dr. Garcia  Colonoscopy 2021 normal with Dr. Garcia              Review of Systems    Objective   Physical Exam    Assessment/Plan   Diagnoses and all orders for this visit:  Maldigestion syndrome  -     Pancreatic Elastase, Fecal; Future  Periumbilical abdominal pain  -     Colonoscopy Diagnostic; Future  -     Esophagogastroduodenoscopy (EGD); Future  Tubular adenoma of colon  -     Colonoscopy Diagnostic; Future  -     sod picosulf-mag ox-citric ac (Clenpiq) 10 mg-3.5 gram- 12 gram/160 mL solution; Take 1 kit by mouth 1 time for 1 dose.  Abdominal bloating  -     Breath Hydrogen Test-Bacterial Overgrowth; Future  -     Pancreatic Elastase, Fecal; Future    Please submit a stool sample for pancreatic elastase level which is a good screening for the condition we call EPI.    Schedule the breath hydrogen test before coming in for a repeat upper and lower endoscopy.    I am still concerned about competing effects of the neurotropic medications and wonder if there is a way to condense the treatments with Dr. Emery.    Geoffrey Hernandez, DO

## 2024-12-20 ENCOUNTER — PROCEDURE VISIT (OUTPATIENT)
Dept: GASTROENTEROLOGY | Facility: CLINIC | Age: 49
End: 2024-12-20
Payer: COMMERCIAL

## 2024-12-20 DIAGNOSIS — R14.0 ABDOMINAL BLOATING: ICD-10-CM

## 2024-12-20 PROCEDURE — 91065 BREATH HYDROGEN/METHANE TEST: CPT

## 2024-12-20 PROCEDURE — 91065 BREATH HYDROGEN/METHANE TEST: CPT | Performed by: NURSE PRACTITIONER

## 2024-12-20 NOTE — PROGRESS NOTES
Patient ID: Derrick Butler is a 49 y.o. female.    Breath Hydrogen Test-Bacterial Overgrowth    Date/Time: 12/20/2024 11:52 AM    Performed by: Zack Smith MA  Authorized by: Geoffrey Hernandez DO    Consent:     Consent obtained:  Verbal    Consent given by:  Patient  Universal protocol:     Procedure explained and questions answered to patient or proxy's satisfaction: yes      Relevant documents present and verified: yes      Test results available: yes      Patient identity confirmed:  Verbally with patient  Sedation:     Sedation type:  None  Anesthesia:     Anesthesia method:  None  Post-procedure details:     Procedure completion:  Tolerated  Hydrogen Breath Analysis Consultation Sheet    Referring Provider: Geoffrey Hernandez DO  [address] [facility] [address]  [place] [address]    Indication: r/o sibo    Symptoms: abdominal bloating (R14.0)    Age: 49 y.o.  Weight: There were no vitals filed for this visit.  Substrate: Glucose   Dose: 75 grams    Last Meal: 12/19/24 2045  Recent Antibiotics: denies    RESULTS:   Time PPM (H2) APPM* (CH4) CO2 Correction   Baseline #1 0940 3 0 4.2 1.30   Baseline #2 0942 2 0 3.3 1.66   *Challenge Dose Sugar: 0945  15' 1000 9 3 3.6 1.52   30' 1015 7 2 3.3 1.66   45' 1030 8 3 3.3 1.66   60' 1045 15 5 3.3 1.66   75' 1100 11 4 2.9 1.89   90' 1115 5 2 3.1 1.77   105' 1130 7 2 3.3 1.66   120' 1145 3 0 3.5 1.57   135'        150'        165'        180'          Impression: Negative for SIBO and methane

## 2024-12-24 ENCOUNTER — LAB (OUTPATIENT)
Dept: LAB | Facility: LAB | Age: 49
End: 2024-12-24
Payer: COMMERCIAL

## 2024-12-24 DIAGNOSIS — R14.0 ABDOMINAL BLOATING: ICD-10-CM

## 2024-12-24 DIAGNOSIS — K59.9 MALDIGESTION SYNDROME: ICD-10-CM

## 2024-12-24 PROCEDURE — 82653 EL-1 FECAL QUANTITATIVE: CPT

## 2024-12-26 ENCOUNTER — APPOINTMENT (OUTPATIENT)
Dept: PAIN MEDICINE | Facility: CLINIC | Age: 49
End: 2024-12-26
Payer: COMMERCIAL

## 2024-12-30 LAB — ELASTASE PANC STL-MCNT: >800 UG/G

## 2025-01-06 ENCOUNTER — TELEPHONE (OUTPATIENT)
Dept: SURGERY | Facility: CLINIC | Age: 50
End: 2025-01-06
Payer: COMMERCIAL

## 2025-01-06 NOTE — TELEPHONE ENCOUNTER
Called and left voicemail with our number for patient to call our office back to schedule consultation with dr beckhma for a colonoscopy and egd

## 2025-01-07 ENCOUNTER — APPOINTMENT (OUTPATIENT)
Dept: SURGERY | Facility: CLINIC | Age: 50
End: 2025-01-07
Payer: COMMERCIAL

## 2025-01-09 NOTE — PROGRESS NOTES
Subjective   Patient ID: Derrick Butler is a 49 y.o. female who presents for colonoscopy and upper endoscopy consult.  HPI  Patient returns to clinic and presents for colonoscopy and upper endoscopy consult.  The records it looks like he had a colonoscopy  in 2015 and she did have polyps.  Performed as she was having abdominal pain at that time.  Patient then had a repeat colonoscopy by me in 2021.  Patient was seen by GI physician who ordered fecal pancreatic elastase level as well as a hydrogen breath test.  Patient's pancreatic enzyme function is normal.  Patient was to schedule endoscopies with the GI physician but he could not get her in until July.  Jeremy is having trouble with severe epigastric discomfort.  She describes it as a burning pain that doubles her over.  She also has episodes of early satiety.  She cannot eat for days when this occurs.  She also has intermittent constipation and diarrhea.  She does take a laxative to help with her bowel movements.  Patient has not however had any weight loss with these episodes.      Patient has been seen in the past for adenocarcinoma right breast.  Her surgery was in 2018.  Patient had a T2 N1 invasive ductal carcinoma of the right breast that was grade 1 out of 3 ER/DC positive HER2/maria elena negative.  She was recommended to have postoperative chemotherapy had a Mediport placed had too much complication and stopped her therapy.  She refused radiation therapy.  She was recommended to take endocrine therapy.  Patient is over 5 years out from her breast cancer diagnosis and continues to follow-up with the high risk nurse practitioner.    Social History:  Tobacco Use - NO  Do you use any recreational drugs - NO  Alcohol Use - YES  Caffeine Intake - YES  Working - [employer]   Marital status -   Living with - ,DAUGHTER AND GRANDDAUGHTER    Past Medical History:   Diagnosis Date    Breast cancer (Multi) 03/01/2018    Right Breast - [facility]    Chronic rhinitis  05/04/2015    Rhinosinusitis    Fibromyalgia     Hx antineoplastic chemo 2018    rt breast cancer    Personal history of gestational diabetes     History of gestational diabetes    Personal history of other diseases of the musculoskeletal system and connective tissue     Personal history of fibromyalgia    Personal history of other diseases of the nervous system and sense organs     History of migraine    Personal history of other diseases of the respiratory system 05/04/2015    History of sore throat    Personal history of other mental and behavioral disorders     History of depression    Personal history of other specified conditions     History of insomnia    Thyroid cancer (Multi) 2013     Past Surgical History:   Procedure Laterality Date    ADENOIDECTOMY  11/10/2016    Adenoidectomy    AXILLARY NODE DISSECTION  06/20/2018    Right Axillary Dissection    BACK SURGERY  02/10/2014    Back Surgery    BI MAMMO GUIDED BREAST RIGHT LOCALIZATION Right 05/16/2018    Wire localization lumpectomy right breast with sentinel node biopsy    BI STEREOTACTIC GUIDED BREAST RIGHT LOCALIZATION AND BIOPSY Right 04/18/2018    BI STEREOTACTIC GUIDED BREAST LOCALIZATION AND BIOPSY RIGHT [facility]    BI US GUIDED BREAST LOCALIZATION AND BIOPSY LEFT Left 03/22/2018    BI US GUIDED BREAST LOCALIZATION AND BIOPSY LEFT [facility]    BLADDER SURGERY  02/10/2014    Bladder Surgery    BREAST RECONSTRUCTION Bilateral 2018    Rt breast lumpectomy, bilat reconstruction    CHOLECYSTECTOMY  02/10/2014    Cholecystectomy    COLONOSCOPY      OTHER SURGICAL HISTORY  05/07/2019    Lumpectomy    OTHER SURGICAL HISTORY  05/07/2019    Breast reconstruction    OTHER SURGICAL HISTORY  05/07/2019    Axillary lymphadenectomy    OTHER SURGICAL HISTORY  05/22/2017    Primary Repair Of Ruptured Achilles Tendon    TONSILLECTOMY  02/10/2014    Tonsillectomy    TOTAL ABDOMINAL HYSTERECTOMY W/ BILATERAL SALPINGOOPHORECTOMY  2019    TOTAL  "THYROIDECTOMY  11/10/2016    Thyroid Surgery Total Thyroidectomy    TUBAL LIGATION  02/10/2014    Tubal Ligation     Family History   Problem Relation Name Age of Onset    Breast cancer Mother's Sister      Breast cancer Maternal Grandmother       Allergies   Allergen Reactions    Docetaxel Shortness of breath    Meloxicam Hives, Rash and Shortness of breath    Oxycodone-Acetaminophen Hives, Itching, Shortness of breath, Swelling and Rash    Pregabalin Palpitations         Review of Systems  /87 (BP Location: Left arm, Patient Position: Sitting, BP Cuff Size: Adult)   Pulse 95   Temp 36.6 °C (97.8 °F) (Temporal)   Resp 17   Ht 1.575 m (5' 2\")   Wt 103 kg (226 lb)   SpO2 97%   BMI 41.34 kg/m²     Objective   Physical Exam  /87 (BP Location: Left arm, Patient Position: Sitting, BP Cuff Size: Adult)   Pulse 95   Temp 36.6 °C (97.8 °F) (Temporal)   Resp 17   Ht 1.575 m (5' 2\")   Wt 103 kg (226 lb)   SpO2 97%   BMI 41.34 kg/m²    GENERAL APPEARANCE: Patient appears in no acute distress.   EYES: Sclera non-icteric, PERRLA.   ENT Normal appearance of ears and nose.   NECK/THYROID: Neck: no masses. Thyroid: no masses.   LYMPH NODES: No cervical or supraclavicular lymphadenopathy.   CARDIOVASCULAR Heart: RRR, no murmurs; Carotid bruits: none; Peripheral edema: none.   RESPIRATORY: Lungs: clear to auscultation bilaterally; no respiratory distress.   GI (ABDOMEN) No intraabdominal mass appreciated, no hepatosplenomegaly; Hernia: none patient with paramedian incision to the left of midline from previous back surgery.; Tenderness: none.   PSYCH: Patient oriented to time, place and person, normal affect.    Assessment/Plan   Problem List Items Addressed This Visit             ICD-10-CM    Personal history of colon polyps, unspecified - Primary Z86.0100    Malignant neoplasm of upper-inner quadrant of female breast C50.219     Patient was seeing me for breast cancer in the past her surgery was in 2018.  " She is over 5 years out from her breast cancer diagnosis and therefore needs to follow-up with a high risk nurse practitioner.  I did explain this to the patient today.  I will order her mammogram as she is overdue for bilateral mammogram and we will refer her to the high risk nurse practitioner.         Relevant Orders    BI mammo bilateral diagnostic            Sadia Garcia MD 01/16/25 2:38 PM

## 2025-01-16 ENCOUNTER — OFFICE VISIT (OUTPATIENT)
Dept: SURGERY | Facility: CLINIC | Age: 50
End: 2025-01-16
Payer: COMMERCIAL

## 2025-01-16 VITALS
HEART RATE: 95 BPM | TEMPERATURE: 97.8 F | WEIGHT: 226 LBS | BODY MASS INDEX: 41.59 KG/M2 | SYSTOLIC BLOOD PRESSURE: 117 MMHG | OXYGEN SATURATION: 97 % | RESPIRATION RATE: 17 BRPM | DIASTOLIC BLOOD PRESSURE: 87 MMHG | HEIGHT: 62 IN

## 2025-01-16 DIAGNOSIS — Z86.0100 PERSONAL HISTORY OF COLON POLYPS, UNSPECIFIED: ICD-10-CM

## 2025-01-16 DIAGNOSIS — Z17.0 MALIGNANT NEOPLASM OF UPPER-INNER QUADRANT OF RIGHT BREAST IN FEMALE, ESTROGEN RECEPTOR POSITIVE: Primary | ICD-10-CM

## 2025-01-16 DIAGNOSIS — C50.211 MALIGNANT NEOPLASM OF UPPER-INNER QUADRANT OF RIGHT BREAST IN FEMALE, ESTROGEN RECEPTOR POSITIVE: ICD-10-CM

## 2025-01-16 DIAGNOSIS — C50.211 MALIGNANT NEOPLASM OF UPPER-INNER QUADRANT OF RIGHT BREAST IN FEMALE, ESTROGEN RECEPTOR POSITIVE: Primary | ICD-10-CM

## 2025-01-16 DIAGNOSIS — Z17.0 MALIGNANT NEOPLASM OF UPPER-INNER QUADRANT OF RIGHT BREAST IN FEMALE, ESTROGEN RECEPTOR POSITIVE: ICD-10-CM

## 2025-01-16 DIAGNOSIS — R10.13 EPIGASTRIC PAIN: Primary | ICD-10-CM

## 2025-01-16 DIAGNOSIS — Z86.0100 PERSONAL HISTORY OF COLON POLYPS, UNSPECIFIED: Primary | ICD-10-CM

## 2025-01-16 PROCEDURE — 99215 OFFICE O/P EST HI 40 MIN: CPT | Performed by: SURGERY

## 2025-01-16 PROCEDURE — 3074F SYST BP LT 130 MM HG: CPT | Performed by: SURGERY

## 2025-01-16 PROCEDURE — 1036F TOBACCO NON-USER: CPT | Performed by: SURGERY

## 2025-01-16 PROCEDURE — 3079F DIAST BP 80-89 MM HG: CPT | Performed by: SURGERY

## 2025-01-16 PROCEDURE — 3008F BODY MASS INDEX DOCD: CPT | Performed by: SURGERY

## 2025-01-16 ASSESSMENT — PATIENT HEALTH QUESTIONNAIRE - PHQ9
SUM OF ALL RESPONSES TO PHQ9 QUESTIONS 1 & 2: 0
2. FEELING DOWN, DEPRESSED OR HOPELESS: NOT AT ALL
1. LITTLE INTEREST OR PLEASURE IN DOING THINGS: NOT AT ALL

## 2025-01-16 ASSESSMENT — ENCOUNTER SYMPTOMS
DEPRESSION: 0
LOSS OF SENSATION IN FEET: 0
OCCASIONAL FEELINGS OF UNSTEADINESS: 0

## 2025-01-16 ASSESSMENT — LIFESTYLE VARIABLES
HOW MANY STANDARD DRINKS CONTAINING ALCOHOL DO YOU HAVE ON A TYPICAL DAY: 1 OR 2
HOW OFTEN DO YOU HAVE SIX OR MORE DRINKS ON ONE OCCASION: NEVER
SKIP TO QUESTIONS 9-10: 1
HOW OFTEN DO YOU HAVE A DRINK CONTAINING ALCOHOL: 2-4 TIMES A MONTH
AUDIT-C TOTAL SCORE: 2

## 2025-01-16 ASSESSMENT — COLUMBIA-SUICIDE SEVERITY RATING SCALE - C-SSRS
6. HAVE YOU EVER DONE ANYTHING, STARTED TO DO ANYTHING, OR PREPARED TO DO ANYTHING TO END YOUR LIFE?: NO
1. IN THE PAST MONTH, HAVE YOU WISHED YOU WERE DEAD OR WISHED YOU COULD GO TO SLEEP AND NOT WAKE UP?: NO
2. HAVE YOU ACTUALLY HAD ANY THOUGHTS OF KILLING YOURSELF?: NO

## 2025-01-16 ASSESSMENT — PAIN SCALES - GENERAL: PAINLEVEL_OUTOF10: 2

## 2025-01-16 NOTE — ASSESSMENT & PLAN NOTE
Patient was seeing me for breast cancer in the past her surgery was in 2018.  She is over 5 years out from her breast cancer diagnosis and therefore needs to follow-up with a high risk nurse practitioner.  I did explain this to the patient today.  I will order her mammogram as she is overdue for bilateral mammogram and we will refer her to the high risk nurse practitioner.

## 2025-01-16 NOTE — ASSESSMENT & PLAN NOTE
Patient with repeated episodes of severe epigastric discomfort with debilitating burning pain anorexia and early satiety.  Indicates has been going on for over 10 years but getting worse.  Patient's weight has been stable.  Patient seems to be fine between the episodes.  Patient's last colonoscopy 2021.  Patient's last EGD was much more remote.  Reports of a CT scan done in [place] remotely that showed gastric wall thickening.  May just be secondary to contraction of the stomach.  At this time I am agreeable to EGD and colonoscopy with random biopsies.  Risk-benefit alternatives of doing the procedures were thoroughly discussed with the patient agrees to proceed.  Patient has already received a bowel prep from another GI physician.  He is okay to use that prep.  I did review previous CT scans.  No evidence of hernia at her paramedian incision site.  No no mention or signs of mesenteric ischemia or calcifications in the mesenteric vessels that would be indicated of of mesenteric ischemia as a source of her discomfort.  Patient recently had fecal pancreatic enzyme function test normal

## 2025-01-16 NOTE — ASSESSMENT & PLAN NOTE
Recommend colonoscopy as patient is overdue.   Recommend repeat colonoscopy.  Risk-benefit alternatives of doing the procedure were thoroughly discussed with the patient agrees to proceed.  This includes a 0.02% risk of perforating the colon with polypectomies.  The RN will go over the bowel prep when scheduling.   Patient had issues with mag citrate prep.  We are now doing a MiraLAX Gatorade prep.

## 2025-01-22 ENCOUNTER — LAB (OUTPATIENT)
Dept: LAB | Facility: LAB | Age: 50
End: 2025-01-22
Payer: COMMERCIAL

## 2025-01-22 ENCOUNTER — PRE-ADMISSION TESTING (OUTPATIENT)
Dept: PREADMISSION TESTING | Facility: HOSPITAL | Age: 50
End: 2025-01-22
Payer: COMMERCIAL

## 2025-01-22 ENCOUNTER — TELEPHONE (OUTPATIENT)
Dept: SURGERY | Facility: CLINIC | Age: 50
End: 2025-01-22

## 2025-01-22 VITALS
BODY MASS INDEX: 41.54 KG/M2 | WEIGHT: 225.7 LBS | SYSTOLIC BLOOD PRESSURE: 128 MMHG | HEIGHT: 62 IN | TEMPERATURE: 98.6 F | OXYGEN SATURATION: 97 % | DIASTOLIC BLOOD PRESSURE: 79 MMHG | HEART RATE: 90 BPM

## 2025-01-22 DIAGNOSIS — Z01.818 PRE-OP EXAMINATION: ICD-10-CM

## 2025-01-22 DIAGNOSIS — Z01.818 PRE-OP EXAMINATION: Primary | ICD-10-CM

## 2025-01-22 LAB
BASOPHILS # BLD AUTO: 0.03 X10*3/UL (ref 0–0.1)
BASOPHILS NFR BLD AUTO: 0.7 %
EOSINOPHIL # BLD AUTO: 0.12 X10*3/UL (ref 0–0.7)
EOSINOPHIL NFR BLD AUTO: 2.7 %
ERYTHROCYTE [DISTWIDTH] IN BLOOD BY AUTOMATED COUNT: 13.4 % (ref 11.5–14.5)
HCT VFR BLD AUTO: 40.9 % (ref 36–46)
HGB BLD-MCNC: 13.3 G/DL (ref 12–16)
IMM GRANULOCYTES # BLD AUTO: 0.01 X10*3/UL (ref 0–0.7)
IMM GRANULOCYTES NFR BLD AUTO: 0.2 % (ref 0–0.9)
LYMPHOCYTES # BLD AUTO: 2.74 X10*3/UL (ref 1.2–4.8)
LYMPHOCYTES NFR BLD AUTO: 61.9 %
MCH RBC QN AUTO: 30.1 PG (ref 26–34)
MCHC RBC AUTO-ENTMCNC: 32.5 G/DL (ref 32–36)
MCV RBC AUTO: 93 FL (ref 80–100)
MONOCYTES # BLD AUTO: 0.37 X10*3/UL (ref 0.1–1)
MONOCYTES NFR BLD AUTO: 8.4 %
NEUTROPHILS # BLD AUTO: 1.16 X10*3/UL (ref 1.2–7.7)
NEUTROPHILS NFR BLD AUTO: 26.1 %
NRBC BLD-RTO: 0 /100 WBCS (ref 0–0)
PLATELET # BLD AUTO: 273 X10*3/UL (ref 150–450)
RBC # BLD AUTO: 4.42 X10*6/UL (ref 4–5.2)
WBC # BLD AUTO: 4.4 X10*3/UL (ref 4.4–11.3)

## 2025-01-22 PROCEDURE — 99204 OFFICE O/P NEW MOD 45 MIN: CPT

## 2025-01-22 PROCEDURE — 93005 ELECTROCARDIOGRAM TRACING: CPT

## 2025-01-22 RX ORDER — BUPROPION HYDROCHLORIDE 150 MG/1
150 TABLET, EXTENDED RELEASE ORAL AS NEEDED
COMMUNITY
Start: 2025-01-15

## 2025-01-22 ASSESSMENT — DUKE ACTIVITY SCORE INDEX (DASI)
CAN YOU DO HEAVY WORK AROUND THE HOUSE LIKE SCRUBBING FLOORS OR LIFTING AND MOVING HEAVY FURNITURE: NO
CAN YOU HAVE SEXUAL RELATIONS: YES
CAN YOU DO LIGHT WORK AROUND THE HOUSE LIKE DUSTING OR WASHING DISHES: YES
CAN YOU RUN A SHORT DISTANCE: NO
DASI METS SCORE: 5.7
CAN YOU WALK INDOORS, SUCH AS AROUND YOUR HOUSE: YES
CAN YOU TAKE CARE OF YOURSELF (EAT, DRESS, BATHE, OR USE TOILET): YES
CAN YOU CLIMB A FLIGHT OF STAIRS OR WALK UP A HILL: NO
CAN YOU DO MODERATE WORK AROUND THE HOUSE LIKE VACUUMING, SWEEPING FLOORS OR CARRYING GROCERIES: NO
CAN YOU WALK A BLOCK OR TWO ON LEVEL GROUND: NO
CAN YOU DO YARD WORK LIKE RAKING LEAVES, WEEDING OR PUSHING A MOWER: NO
CAN YOU RUN A SHORT DISTANCE: NO
CAN YOU CLIMB A FLIGHT OF STAIRS OR WALK UP A HILL: YES
CAN YOU WALK A BLOCK OR TWO ON LEVEL GROUND: YES
CAN YOU DO MODERATE WORK AROUND THE HOUSE LIKE VACUUMING, SWEEPING FLOORS OR CARRYING GROCERIES: YES
TOTAL_SCORE: 24.2
CAN YOU DO HEAVY WORK AROUND THE HOUSE LIKE SCRUBBING FLOORS OR LIFTING AND MOVING HEAVY FURNITURE: NO
CAN YOU TAKE CARE OF YOURSELF (EAT, DRESS, BATHE, OR USE TOILET): NO
CAN YOU WALK INDOORS, SUCH AS AROUND YOUR HOUSE: NO
CAN YOU PARTICIPATE IN MODERATE RECREATIONAL ACTIVITIES LIKE GOLF, BOWLING, DANCING, DOUBLES TENNIS OR THROWING A BASEBALL OR FOOTBALL: NO
CAN YOU PARTICIPATE IN STRENOUS SPORTS LIKE SWIMMING, SINGLES TENNIS, FOOTBALL, BASKETBALL, OR SKIING: NO
CAN YOU DO LIGHT WORK AROUND THE HOUSE LIKE DUSTING OR WASHING DISHES: NO

## 2025-01-22 ASSESSMENT — ENCOUNTER SYMPTOMS
CONSTITUTIONAL NEGATIVE: 1
NEUROLOGICAL NEGATIVE: 1
PSYCHIATRIC NEGATIVE: 1
CONSTIPATION: 1
RESPIRATORY NEGATIVE: 1
ABDOMINAL DISTENTION: 1
MUSCULOSKELETAL NEGATIVE: 1
ALLERGIC/IMMUNOLOGIC NEGATIVE: 1
NAUSEA: 1
DIARRHEA: 1
ABDOMINAL PAIN: 1
EYES NEGATIVE: 1
ENDOCRINE NEGATIVE: 1
HEMATOLOGIC/LYMPHATIC NEGATIVE: 1

## 2025-01-22 ASSESSMENT — PAIN DESCRIPTION - DESCRIPTORS: DESCRIPTORS: ACHING

## 2025-01-22 ASSESSMENT — PAIN - FUNCTIONAL ASSESSMENT: PAIN_FUNCTIONAL_ASSESSMENT: 0-10

## 2025-01-22 NOTE — H&P (VIEW-ONLY)
CPM/PAT Evaluation       Name: Derrick Butler (Derrick Butler)  /Age: 1975/49 y.o.     In-Person       Chief Complaint: Abdominal pain    HPI: Derrick Butler is a 49 year old female with a history of breast cancer, thyroid cancer, and hyperlipidemia. She has complaints of abdominal pain. She states she has been dealing with abdominal pain over the past 10-15 years. She states she has epigastric pain with nausea. She states she has bloating and the stomach becomes rock hard. She states she has both constipation and diarrhea. She mentioned small meals make her full. She had multiple ER visits with no definitive answers to why she is having this pain but was told possible motility disorder. She is scheduled for an EGD and colonoscopy. She states her last colonoscopy was in  and she had polyps removed. She denies blood in the stool. She denies fever, chills, vomiting, chest pain, sob, dizziness and palpitations.    Past Medical History:   Diagnosis Date    Breast cancer (Multi) 2018    Right Breast - [facility]    Chronic rhinitis 2015    Rhinosinusitis    Depression     Fibromyalgia     Hx antineoplastic chemo     rt breast cancer    Personal history of gestational diabetes     History of gestational diabetes    Personal history of other diseases of the musculoskeletal system and connective tissue     Personal history of fibromyalgia    Personal history of other diseases of the nervous system and sense organs     History of migraine    Personal history of other specified conditions     History of insomnia    Thyroid cancer (Multi)        Past Surgical History:   Procedure Laterality Date    ADENOIDECTOMY  11/10/2016    Adenoidectomy    AXILLARY NODE DISSECTION  2018    Right Axillary Dissection    BACK SURGERY  02/10/2014    Back Surgery    BI MAMMO GUIDED BREAST RIGHT LOCALIZATION Right 2018    Wire localization lumpectomy right breast with sentinel node biopsy    BI STEREOTACTIC GUIDED  BREAST RIGHT LOCALIZATION AND BIOPSY Right 04/18/2018    BI STEREOTACTIC GUIDED BREAST LOCALIZATION AND BIOPSY RIGHT [facility]    BI US GUIDED BREAST LOCALIZATION AND BIOPSY LEFT Left 03/22/2018    BI US GUIDED BREAST LOCALIZATION AND BIOPSY LEFT [facility]    BLADDER SURGERY  02/10/2014    Bladder Surgery    BREAST RECONSTRUCTION Bilateral 2018    Rt breast lumpectomy, bilat reconstruction    CHOLECYSTECTOMY  02/10/2014    Cholecystectomy    COLONOSCOPY      OTHER SURGICAL HISTORY  05/07/2019    Lumpectomy    OTHER SURGICAL HISTORY  05/07/2019    Breast reconstruction    OTHER SURGICAL HISTORY  05/07/2019    Axillary lymphadenectomy    OTHER SURGICAL HISTORY  05/22/2017    Primary Repair Of Ruptured Achilles Tendon    TONSILLECTOMY  02/10/2014    Tonsillectomy    TOTAL ABDOMINAL HYSTERECTOMY W/ BILATERAL SALPINGOOPHORECTOMY  2019    TOTAL THYROIDECTOMY  11/10/2016    Thyroid Surgery Total Thyroidectomy    TUBAL LIGATION  02/10/2014    Tubal Ligation       Social History     Tobacco Use    Smoking status: Never     Passive exposure: Never    Smokeless tobacco: Never   Substance Use Topics    Alcohol use: Not Currently     Social History     Substance and Sexual Activity   Drug Use Not Currently    Types: Marijuana    Comment: THC GUMMIES - HAS NOT USED FOR OVER A YEAR         Family History   Problem Relation Name Age of Onset    Breast cancer Mother's Sister      Breast cancer Maternal Grandmother         Allergies   Allergen Reactions    Docetaxel Shortness of breath    Meloxicam Hives, Rash and Shortness of breath    Oxycodone-Acetaminophen Hives, Itching, Shortness of breath, Swelling and Rash    Pregabalin Palpitations     Current Outpatient Medications   Medication Sig Dispense Refill    buPROPion SR (Wellbutrin SR) 150 mg 12 hr tablet Take 1 tablet (150 mg) by mouth if needed.      busPIRone (Buspar) 30 mg tablet Take 1 tablet (30 mg) by mouth 2 times a day.      calcitriol (Rocaltrol) 0.5  mcg capsule Take 1 capsule by mouth once daily 90 capsule 1    calcium carbonate (CALTRATE 600 ORAL) Take 2 tablets by mouth once daily.      gabapentin (Neurontin) 600 mg tablet Take 1 tablet (600 mg) by mouth once daily.      L.acid/L.casei/B.bif/B.wilber/FOS (PROBIOTIC BLEND ORAL) Take by mouth.      levothyroxine (Synthroid, Levoxyl) 112 mcg tablet TAKE 2 TABLETS BY MOUTH ONCE DAILY ON AN EMPTY STOMACH 180 tablet 0    LORazepam (Ativan) 1 mg tablet Take 1.5 tablets (1.5 mg) by mouth if needed.      melatonin 10 mg tablet,disintegrating Take 1 tablet by mouth at 3:00 in the morning.      metFORMIN  mg 24 hr tablet Take 1 tablet (500 mg) by mouth once daily with breakfast.      methocarbamol (Robaxin) 500 mg tablet Take 1 tablet (500 mg) by mouth 4 times a day. (Patient taking differently: Take 1 tablet (500 mg) by mouth 4 times a day as needed for muscle spasms.) 120 tablet 3    ondansetron ODT (Zofran-ODT) 4 mg disintegrating tablet Dissolve 1 tablet (4 mg) in the mouth every 8 hours if needed for nausea.      pantoprazole (ProtoNix) 20 mg EC tablet Take 1 tablet (20 mg) by mouth once daily.      polyethylene glycol (Glycolax, Miralax) 17 gram/dose powder Mix 17 g of powder and drink if needed. IN 8 OUNCES OF WATER, JUICE, OR TEA      sertraline (Zoloft) 100 mg tablet Take 1.5 tablets (150 mg) by mouth once daily.      traZODone (Desyrel) 50 mg tablet Take 1 tablet (50 mg) by mouth as needed at bedtime.      zolpidem (Ambien) 10 mg tablet Take 1 tablet (10 mg) by mouth as needed at bedtime for sleep.      albuterol 90 mcg/actuation inhaler INHALE TWO PUFFS BY MOUTH AS INSTRUCTED EVERY 6 HOURS AS NEEDED FOR WHEEZING OR SHORTNESS OF BREATH. (Patient not taking: Reported on 1/22/2025)      albuterol 90 mcg/actuation inhaler Inhale 2 puffs every 6 hours if needed.       No current facility-administered medications for this visit.       Review of Systems   Constitutional: Negative.    HENT: Negative.     Eyes:  "Negative.    Respiratory: Negative.     Cardiovascular:  Positive for leg swelling.   Gastrointestinal:  Positive for abdominal distention, abdominal pain, constipation, diarrhea and nausea.   Endocrine: Negative.    Genitourinary: Negative.    Musculoskeletal: Negative.    Skin: Negative.    Allergic/Immunologic: Negative.    Neurological: Negative.    Hematological: Negative.    Psychiatric/Behavioral: Negative.                Physical Exam  Vitals reviewed.   Constitutional:       Appearance: Normal appearance.   HENT:      Head: Normocephalic and atraumatic.      Nose: Nose normal.      Mouth/Throat:      Mouth: Mucous membranes are moist.      Pharynx: Oropharynx is clear.   Eyes:      Extraocular Movements: Extraocular movements intact.      Conjunctiva/sclera: Conjunctivae normal.   Cardiovascular:      Rate and Rhythm: Normal rate and regular rhythm.      Pulses: Normal pulses.      Heart sounds: Normal heart sounds.   Pulmonary:      Effort: Pulmonary effort is normal.      Breath sounds: Normal breath sounds.   Abdominal:      General: Bowel sounds are normal.      Palpations: Abdomen is soft.   Genitourinary:     Comments: Assessment deferred to physician  Musculoskeletal:         General: Normal range of motion.      Cervical back: Normal range of motion and neck supple.   Skin:     General: Skin is warm and dry.   Neurological:      General: No focal deficit present.      Mental Status: She is alert and oriented to person, place, and time.   Psychiatric:         Mood and Affect: Mood normal.         Behavior: Behavior normal.         Thought Content: Thought content normal.         Judgment: Judgment normal.          PAT AIRWAY:   Airway:     Mallampati::  II    TM distance::  >3 FB    Neck ROM::  Full  normal            Visit Vitals  /79   Pulse 90   Temp 37 °C (98.6 °F) (Temporal)   Ht 1.575 m (5' 2\")   Wt 102 kg (225 lb 11.2 oz)   SpO2 97%   BMI 41.28 kg/m²   OB Status Oopherectomy   Smoking " Status Never   BSA 2.11 m²     ASA: III  CHADS2: 1.9%  RCRI: 0.9  DASI: 24.2  METS: 5.7  STOP BAN        Assessment and Plan:     Epigastric pain: EGD/Colonoscopy  HX of right breast cancer: Lumpectomy. Patient states she only had 2 rounds of chemo and her heart stopped during the 2nd treatment. She denies any other treatment after.  Thyroid Cancer : Thyroidectomy. Levothyroxine  Fibromyalgia: Robaxin  Depression/anxiety: Zoloft, Ativan, Buspar, Wellbutrin  ER visit in 2024. Patient was having a MRI and started having chest pain. Per the the ER note she states she has been having intermittent episodes of chest pain for a year to a year and a half. She has not been evaluated for this. She stated it feels like her heart is being squeezed. She followed up with primary who had her wear a Zio patch in May,  findings showed predominant sinus rhythm with an episode of SVT 6 beats, rare PAC and PVC.  Primary wanted her to have cardiac testing and consulted Cardiology but stated the patient did not follow up due to anxiety and had the patient reschedule. The patient never followed up. Patient needs Cardiac Clearance. Dr. Garcia's office is aware.  Prediabetes: Metformin    [facility]  [facility] done in [facility]  REED Celaya-CNP

## 2025-01-22 NOTE — CPM/PAT H&P
CPM/PAT Evaluation       Name: Derrick Butler (Derrick Butler)  /Age: 1975/49 y.o.     In-Person       Chief Complaint: Abdominal pain    HPI: Derrick Butler is a 49 year old female with a history of breast cancer, thyroid cancer, and hyperlipidemia. She has complaints of abdominal pain. She states she has been dealing with abdominal pain over the past 10-15 years. She states she has epigastric pain with nausea. She states she has bloating and the stomach becomes rock hard. She states she has both constipation and diarrhea. She mentioned small meals make her full. She had multiple ER visits with no definitive answers to why she is having this pain but was told possible motility disorder. She is scheduled for an EGD and colonoscopy. She states her last colonoscopy was in  and she had polyps removed. She denies blood in the stool. She denies fever, chills, vomiting, chest pain, sob, dizziness and palpitations.    Past Medical History:   Diagnosis Date    Breast cancer (Multi) 2018    Right Breast - IDC    Chronic rhinitis 2015    Rhinosinusitis    Depression     Fibromyalgia     Hx antineoplastic chemo     rt breast cancer    Personal history of gestational diabetes     History of gestational diabetes    Personal history of other diseases of the musculoskeletal system and connective tissue     Personal history of fibromyalgia    Personal history of other diseases of the nervous system and sense organs     History of migraine    Personal history of other specified conditions     History of insomnia    Thyroid cancer (Multi)        Past Surgical History:   Procedure Laterality Date    ADENOIDECTOMY  11/10/2016    Adenoidectomy    AXILLARY NODE DISSECTION  2018    Right Axillary Dissection    BACK SURGERY  02/10/2014    Back Surgery    BI MAMMO GUIDED BREAST RIGHT LOCALIZATION Right 2018    Wire localization lumpectomy right breast with sentinel node biopsy    BI STEREOTACTIC GUIDED  BREAST RIGHT LOCALIZATION AND BIOPSY Right 04/18/2018    BI STEREOTACTIC GUIDED BREAST LOCALIZATION AND BIOPSY RIGHT [facility]    BI US GUIDED BREAST LOCALIZATION AND BIOPSY LEFT Left 03/22/2018    BI US GUIDED BREAST LOCALIZATION AND BIOPSY LEFT [facility]    BLADDER SURGERY  02/10/2014    Bladder Surgery    BREAST RECONSTRUCTION Bilateral 2018    Rt breast lumpectomy, bilat reconstruction    CHOLECYSTECTOMY  02/10/2014    Cholecystectomy    COLONOSCOPY      OTHER SURGICAL HISTORY  05/07/2019    Lumpectomy    OTHER SURGICAL HISTORY  05/07/2019    Breast reconstruction    OTHER SURGICAL HISTORY  05/07/2019    Axillary lymphadenectomy    OTHER SURGICAL HISTORY  05/22/2017    Primary Repair Of Ruptured Achilles Tendon    TONSILLECTOMY  02/10/2014    Tonsillectomy    TOTAL ABDOMINAL HYSTERECTOMY W/ BILATERAL SALPINGOOPHORECTOMY  2019    TOTAL THYROIDECTOMY  11/10/2016    Thyroid Surgery Total Thyroidectomy    TUBAL LIGATION  02/10/2014    Tubal Ligation       Social History     Tobacco Use    Smoking status: Never     Passive exposure: Never    Smokeless tobacco: Never   Substance Use Topics    Alcohol use: Not Currently     Social History     Substance and Sexual Activity   Drug Use Not Currently    Types: Marijuana    Comment: THC GUMMIES - HAS NOT USED FOR OVER A YEAR         Family History   Problem Relation Name Age of Onset    Breast cancer Mother's Sister      Breast cancer Maternal Grandmother         Allergies   Allergen Reactions    Docetaxel Shortness of breath    Meloxicam Hives, Rash and Shortness of breath    Oxycodone-Acetaminophen Hives, Itching, Shortness of breath, Swelling and Rash    Pregabalin Palpitations     Current Outpatient Medications   Medication Sig Dispense Refill    buPROPion SR (Wellbutrin SR) 150 mg 12 hr tablet Take 1 tablet (150 mg) by mouth if needed.      busPIRone (Buspar) 30 mg tablet Take 1 tablet (30 mg) by mouth 2 times a day.      calcitriol (Rocaltrol) 0.5  mcg capsule Take 1 capsule by mouth once daily 90 capsule 1    calcium carbonate (CALTRATE 600 ORAL) Take 2 tablets by mouth once daily.      gabapentin (Neurontin) 600 mg tablet Take 1 tablet (600 mg) by mouth once daily.      L.acid/L.casei/B.bif/B.wilber/FOS (PROBIOTIC BLEND ORAL) Take by mouth.      levothyroxine (Synthroid, Levoxyl) 112 mcg tablet TAKE 2 TABLETS BY MOUTH ONCE DAILY ON AN EMPTY STOMACH 180 tablet 0    LORazepam (Ativan) 1 mg tablet Take 1.5 tablets (1.5 mg) by mouth if needed.      melatonin 10 mg tablet,disintegrating Take 1 tablet by mouth at 3:00 in the morning.      metFORMIN  mg 24 hr tablet Take 1 tablet (500 mg) by mouth once daily with breakfast.      methocarbamol (Robaxin) 500 mg tablet Take 1 tablet (500 mg) by mouth 4 times a day. (Patient taking differently: Take 1 tablet (500 mg) by mouth 4 times a day as needed for muscle spasms.) 120 tablet 3    ondansetron ODT (Zofran-ODT) 4 mg disintegrating tablet Dissolve 1 tablet (4 mg) in the mouth every 8 hours if needed for nausea.      pantoprazole (ProtoNix) 20 mg EC tablet Take 1 tablet (20 mg) by mouth once daily.      polyethylene glycol (Glycolax, Miralax) 17 gram/dose powder Mix 17 g of powder and drink if needed. IN 8 OUNCES OF WATER, JUICE, OR TEA      sertraline (Zoloft) 100 mg tablet Take 1.5 tablets (150 mg) by mouth once daily.      traZODone (Desyrel) 50 mg tablet Take 1 tablet (50 mg) by mouth as needed at bedtime.      zolpidem (Ambien) 10 mg tablet Take 1 tablet (10 mg) by mouth as needed at bedtime for sleep.      albuterol 90 mcg/actuation inhaler INHALE TWO PUFFS BY MOUTH AS INSTRUCTED EVERY 6 HOURS AS NEEDED FOR WHEEZING OR SHORTNESS OF BREATH. (Patient not taking: Reported on 1/22/2025)      albuterol 90 mcg/actuation inhaler Inhale 2 puffs every 6 hours if needed.       No current facility-administered medications for this visit.       Review of Systems   Constitutional: Negative.    HENT: Negative.     Eyes:  "Negative.    Respiratory: Negative.     Cardiovascular:  Positive for leg swelling.   Gastrointestinal:  Positive for abdominal distention, abdominal pain, constipation, diarrhea and nausea.   Endocrine: Negative.    Genitourinary: Negative.    Musculoskeletal: Negative.    Skin: Negative.    Allergic/Immunologic: Negative.    Neurological: Negative.    Hematological: Negative.    Psychiatric/Behavioral: Negative.                Physical Exam  Vitals reviewed.   Constitutional:       Appearance: Normal appearance.   HENT:      Head: Normocephalic and atraumatic.      Nose: Nose normal.      Mouth/Throat:      Mouth: Mucous membranes are moist.      Pharynx: Oropharynx is clear.   Eyes:      Extraocular Movements: Extraocular movements intact.      Conjunctiva/sclera: Conjunctivae normal.   Cardiovascular:      Rate and Rhythm: Normal rate and regular rhythm.      Pulses: Normal pulses.      Heart sounds: Normal heart sounds.   Pulmonary:      Effort: Pulmonary effort is normal.      Breath sounds: Normal breath sounds.   Abdominal:      General: Bowel sounds are normal.      Palpations: Abdomen is soft.   Genitourinary:     Comments: Assessment deferred to physician  Musculoskeletal:         General: Normal range of motion.      Cervical back: Normal range of motion and neck supple.   Skin:     General: Skin is warm and dry.   Neurological:      General: No focal deficit present.      Mental Status: She is alert and oriented to person, place, and time.   Psychiatric:         Mood and Affect: Mood normal.         Behavior: Behavior normal.         Thought Content: Thought content normal.         Judgment: Judgment normal.          PAT AIRWAY:   Airway:     Mallampati::  II    TM distance::  >3 FB    Neck ROM::  Full  normal            Visit Vitals  /79   Pulse 90   Temp 37 °C (98.6 °F) (Temporal)   Ht 1.575 m (5' 2\")   Wt 102 kg (225 lb 11.2 oz)   SpO2 97%   BMI 41.28 kg/m²   OB Status Oopherectomy   Smoking " Status Never   BSA 2.11 m²     ASA: III  CHADS2: 1.9%  RCRI: 0.9  DASI: 24.2  METS: 5.7  STOP BAN        Assessment and Plan:     Epigastric pain: EGD/Colonoscopy  HX of right breast cancer: Lumpectomy. Patient states she only had 2 rounds of chemo and her heart stopped during the 2nd treatment. She denies any other treatment after.  Thyroid Cancer : Thyroidectomy. Levothyroxine  Fibromyalgia: Robaxin  Depression/anxiety: Zoloft, Ativan, Buspar, Wellbutrin  ER visit in 2024. Patient was having a MRI and started having chest pain. Per the the ER note she states she has been having intermittent episodes of chest pain for a year to a year and a half. She has not been evaluated for this. She stated it feels like her heart is being squeezed. She followed up with primary who had her wear a Zio patch in May,  findings showed predominant sinus rhythm with an episode of SVT 6 beats, rare PAC and PVC.  Primary wanted her to have cardiac testing and consulted Cardiology but stated the patient did not follow up due to anxiety and had the patient reschedule. The patient never followed up. Patient needs Cardiac Clearance. Dr. Garcia's office is aware.  Prediabetes: Metformin    [facility]  E[facility] done in [facility]  REED Celaya-CNP

## 2025-01-22 NOTE — TELEPHONE ENCOUNTER
We receive phone call from PAT today regarding patients procedure with Dr. Garcia on 1/28/2025. They stated patient will need cardiac clearance to proceed with procedure. Patient verbalize understanding and she was scheduled for a cardiac clearance appointment with Dr. Giordano at [facility] on 1/27/2025.    Nahed Uribe CMA.

## 2025-01-22 NOTE — PREPROCEDURE INSTRUCTIONS
Medication List            Accurate as of January 22, 2025  2:02 PM. Always use your most recent med list.                      Ambien 10 mg tablet  Generic drug: zolpidem  Medication Adjustments for Surgery: Take/Use as prescribed     buPROPion  mg 12 hr tablet  Commonly known as: Wellbutrin SR  Medication Adjustments for Surgery: Take/Use as prescribed     busPIRone 30 mg tablet  Commonly known as: Buspar  Medication Adjustments for Surgery: Take on the morning of surgery     calcitriol 0.5 mcg capsule  Commonly known as: Rocaltrol  Take 1 capsule by mouth once daily  Additional Medication Adjustments for Surgery: Take last dose 7 days before surgery     CALTRATE 600 ORAL  Additional Medication Adjustments for Surgery: Take last dose 7 days before surgery     gabapentin 600 mg tablet  Commonly known as: Neurontin  Medication Adjustments for Surgery: Take on the morning of surgery     levothyroxine 112 mcg tablet  Commonly known as: Synthroid, Levoxyl  TAKE 2 TABLETS BY MOUTH ONCE DAILY ON AN EMPTY STOMACH  Medication Adjustments for Surgery: Take on the morning of surgery     LORazepam 1 mg tablet  Commonly known as: Ativan  Medication Adjustments for Surgery: Take/Use as prescribed     melatonin 10 mg tablet,disintegrating  Additional Medication Adjustments for Surgery: Take last dose 7 days before surgery     metFORMIN  mg 24 hr tablet  Commonly known as: Glucophage-XR  Medication Adjustments for Surgery: Do Not take on the morning of surgery     methocarbamol 500 mg tablet  Commonly known as: Robaxin  Take 1 tablet (500 mg) by mouth 4 times a day.  Medication Adjustments for Surgery: Take/Use as prescribed     ondansetron ODT 4 mg disintegrating tablet  Commonly known as: Zofran-ODT  Medication Adjustments for Surgery: Take/Use as prescribed     pantoprazole 20 mg EC tablet  Commonly known as: ProtoNix  Medication Adjustments for Surgery: Take on the morning of surgery     polyethylene glycol 17  gram/dose powder  Commonly known as: Glycolax, Miralax  Medication Adjustments for Surgery: Do Not take on the morning of surgery     PROBIOTIC BLEND ORAL  Additional Medication Adjustments for Surgery: Take last dose 7 days before surgery     sertraline 100 mg tablet  Commonly known as: Zoloft  Medication Adjustments for Surgery: Take on the morning of surgery     traZODone 50 mg tablet  Commonly known as: Desyrel  Medication Adjustments for Surgery: Take/Use as prescribed           * This list has 2 medication(s) that are the same as other medications prescribed for you. Read the directions carefully, and ask your doctor or other care provider to review them with you.                     FOLLOW BOWEL PREP INSTRUCTIONS RECEIVED FROM DR. GRACE'S OFFICE               Why must I stop eating and drinking near surgery time?  With sedation, food or liquid in your stomach can enter your lungs causing serious complications  Increases nausea and vomiting    When do I need to stop eating and drinking before my surgery?   Do not eat or drink after midnight the night before your surgery/procedure.  You may have small sips of water to take your medication.        PAT DISCHARGE INSTRUCTIONS    Please call the Same Day Surgery (SDS) Department of the hospital where your procedure will be performed after 2:00 PM the day before your surgery. If you are scheduled on a Monday, or a Tuesday following a Monday holiday, you will need to call on the last business day prior to your surgery.    [facility]  [address] [place] [address] [phone]  [facility]  [id number] [place] [address], [address] [phone]  [facility]  [address] [facility].  [place] [id number] [phone]    Please let your surgeon know if:      You develop any open sores, shingles, burning or painful urination as  these may increase your risk of an infection.   You no longer wish to have the surgery.   Any other personal circumstances change that may lead to the need to cancel or defer this surgery-such as being sick or getting admitted to any hospital within one week of your planned procedure.    Your contact details change, such as a change of address or phone number.    Starting now:     Please DO NOT drink alcohol or smoke for 24 hours before surgery. It is well known that quitting smoking can make a huge difference to your health and recovery from surgery. The longer you abstain from smoking, the better your chances of a healthy recovery. If you need help with quitting, call 5-372-QUIT-NOW to be connected to a trained counselor who will discuss the best methods to help you quit.     Before your surgery:    Please stop all supplements 7 days prior to surgery. Or as directed by your surgeon.   Please stop taking NSAID pain medicine such as Advil and Motrin 7 days before surgery.    If you develop any fever, cough, cold, rashes, cuts, scratches, scrapes, urinary symptoms or infection anywhere on your body (including teeth and gums) prior to surgery, please call your surgeon’s office as soon as possible. This may require treatment to reduce the chance of cancellation on the day of surgery.    The day before your surgery:   DIET- Please follow the diet instructions at the top of your packet.   Get a good night’s rest.  Use the special soap for bathing if you have been instructed to use one.    Scheduled surgery times may change and you will be notified if this occurs - please check your personal voicemail for any updates.     On the morning of surgery:   Wear comfortable, loose fitting clothes which open in the front. Please do not wear moisturizers, creams, lotions, makeup or perfume.    Please bring with you to surgery:   Photo ID and insurance card   Current list of medicines and allergies   Pacemaker/ Defibrillator/Heart  stent cards   CPAP machine and mask    Slings/ splints/ crutches   A copy of your complete advanced directive/DHPOA.    Please do NOT bring with you to surgery:   All jewelry and valuables should be left at home.   Prosthetic devices such as contact lenses, hearing aids, dentures, eyelash extensions, hairpins and body piercings must be removed prior to going in to the surgical suite.    After outpatient surgery:   A responsible adult MUST accompany you at the time of discharge and stay with you for 24 hours after your surgery. You may NOT drive yourself home after surgery.    Do not drive, operate machinery, make critical decisions or do activities that require co-ordination or balance until after a night’s sleep.   Do not drink alcoholic beverages for 24 hours.   Instructions for resuming your medications will be provided by your surgeon.    CALL YOUR DOCTOR AFTER SURGERY IF YOU HAVE:     Chills and/or a fever of 101° F or higher.    Redness, swelling, pus or drainage from your surgical wound or a bad smell from the wound.    Lightheadedness, fainting or confusion.    Persistent vomiting (throwing up) and are not able to eat or drink for 12 hours.    Three or more loose, watery bowel movements in 24 hours (diarrhea).   Difficulty or pain while urinating( after non-urological surgery)    Pain and swelling in your legs, especially if it is only on one side.    Difficulty breathing or are breathing faster than normal.    Any new concerning symptoms.

## 2025-01-23 ENCOUNTER — TELEPHONE (OUTPATIENT)
Dept: SURGERY | Facility: CLINIC | Age: 50
End: 2025-01-23
Payer: COMMERCIAL

## 2025-01-23 NOTE — TELEPHONE ENCOUNTER
Patient called today to ask Dr. Garcia a question regarding medication she takes. Patient states she takes calcium carbonate/caltrate and calcitriol since she does not have her thryoid anymore. Patient was told by PAT she need to stop taking vitamins/supplements 7 days prior to procedure on 1/28/2025. Patient states she is already not feeling and well and does not know what to do.    Dr. Garcia please advise and review, thank you again!    Nahed Uribe CMA

## 2025-01-24 LAB
ATRIAL RATE: 77 BPM
P AXIS: 57 DEGREES
P OFFSET: 204 MS
P ONSET: 149 MS
PR INTERVAL: 150 MS
Q ONSET: 224 MS
QRS COUNT: 13 BEATS
QRS DURATION: 64 MS
QT INTERVAL: 388 MS
QTC CALCULATION(BAZETT): 439 MS
QTC FREDERICIA: 421 MS
R AXIS: 54 DEGREES
T AXIS: 71 DEGREES
T OFFSET: 418 MS
VENTRICULAR RATE: 77 BPM

## 2025-01-24 NOTE — TELEPHONE ENCOUNTER
Called patient back regarding encounter below. Let patient know per Dr. Garcia she can go ahead and start taking her thyroid medication. Dr. Garcia stated it was fine for her to take it. Patient verbalized understanding and will start taking her medication again.    Nahed Uribe CMA

## 2025-01-26 NOTE — PROGRESS NOTES
Subjective      No chief complaint on file.       49-year-old female presents for cardiac evaluation as well as preprocedural cardiac risk stratification for endoscopy.    She has an EKG from 4 days ago that is normal.    She has fibromyalgia, and approximately 6 months ago she started topamax for her pain. She had chest pain with this, was seen in the ED and rulled out for ACS. She stopped the medication and chest pain issues went away. She has palpitations infrequently, 1x/month. She is not active.          Review of Systems   Constitutional: Negative for diaphoresis, fever, weight gain and weight loss.   Eyes:  Negative for visual disturbance.   Cardiovascular:  Positive for palpitations. Negative for chest pain, claudication, dyspnea on exertion, irregular heartbeat, leg swelling, near-syncope, orthopnea, paroxysmal nocturnal dyspnea and syncope.   Respiratory:  Negative for cough, shortness of breath and wheezing.    Musculoskeletal:  Negative for muscle weakness and myalgias.   Neurological:  Negative for dizziness and weakness.   All other systems reviewed and are negative.       Past Medical History:   Diagnosis Date    Breast cancer (Multi) 03/01/2018    Right Breast - IDC    Chronic rhinitis 05/04/2015    Rhinosinusitis    Depression     Fibromyalgia     Hx antineoplastic chemo 2018    rt breast cancer    Personal history of gestational diabetes     History of gestational diabetes    Personal history of other diseases of the musculoskeletal system and connective tissue     Personal history of fibromyalgia    Personal history of other diseases of the nervous system and sense organs     History of migraine    Personal history of other specified conditions     History of insomnia    Thyroid cancer (Multi) 2013        Past Surgical History:   Procedure Laterality Date    ADENOIDECTOMY  11/10/2016    Adenoidectomy    AXILLARY NODE DISSECTION  06/20/2018    Right Axillary Dissection    BACK SURGERY  02/10/2014     Back Surgery    BI MAMMO GUIDED BREAST RIGHT LOCALIZATION Right 05/16/2018    Wire localization lumpectomy right breast with sentinel node biopsy    BI STEREOTACTIC GUIDED BREAST RIGHT LOCALIZATION AND BIOPSY Right 04/18/2018    BI STEREOTACTIC GUIDED BREAST LOCALIZATION AND BIOPSY RIGHT [facility]    BI US GUIDED BREAST LOCALIZATION AND BIOPSY LEFT Left 03/22/2018    BI US GUIDED BREAST LOCALIZATION AND BIOPSY LEFT [facility]    BLADDER SURGERY  02/10/2014    Bladder Surgery    BREAST RECONSTRUCTION Bilateral 2018    Rt breast lumpectomy, bilat reconstruction    CHOLECYSTECTOMY  02/10/2014    Cholecystectomy    COLONOSCOPY      OTHER SURGICAL HISTORY  05/07/2019    Lumpectomy    OTHER SURGICAL HISTORY  05/07/2019    Breast reconstruction    OTHER SURGICAL HISTORY  05/07/2019    Axillary lymphadenectomy    OTHER SURGICAL HISTORY  05/22/2017    Primary Repair Of Ruptured Achilles Tendon    TONSILLECTOMY  02/10/2014    Tonsillectomy    TOTAL ABDOMINAL HYSTERECTOMY W/ BILATERAL SALPINGOOPHORECTOMY  2019    TOTAL THYROIDECTOMY  11/10/2016    Thyroid Surgery Total Thyroidectomy    TUBAL LIGATION  02/10/2014    Tubal Ligation        Social History     Socioeconomic History    Marital status:      Spouse name: Not on file    Number of children: Not on file    Years of education: Not on file    Highest education level: Not on file   Occupational History    Not on file   Tobacco Use    Smoking status: Never     Passive exposure: Never    Smokeless tobacco: Never   Vaping Use    Vaping status: Never Used   Substance and Sexual Activity    Alcohol use: Not Currently    Drug use: Not Currently     Types: Marijuana     Comment: THC GUMMIES - HAS NOT USED FOR OVER A YEAR    Sexual activity: Defer   Other Topics Concern    Not on file   Social History Narrative    Not on file     Social Drivers of Health     Financial Resource Strain: Not on File (5/21/2021)    Received from [facility]    Financial  Resource Strain     Financial Resource Strain: 0   Food Insecurity: Not on File (2024)    Received from [facility]    Food Insecurity     Food: 0   Transportation Needs: Unknown (3/13/2024)    [facility] : Transportation     Lack of Transportation (Medical): Not on file     Lack of Transportation (Non-Medical): Not on file     Patient Unable or Declines to Respond: Yes   Physical Activity: Not on File (2021)    Received from [facility]    Physical Activity     Physical Activity: 0   Stress: Not on File (2021)    Received from [facility]    Stress     Stress: 0   Social Connections: Not on File (2024)    Received from [facility]    Social Connections     Connectedness: 0   Intimate Partner Violence: Not on file   Housing Stability: Not on File (2021)    Received from [facility]    Housing Stability     Housin        Family History   Problem Relation Name Age of Onset    Breast cancer Mother's Sister      Breast cancer Maternal Grandmother          OBJECTIVE:    Vitals:    25 1509   BP: 128/66   Pulse: 94   SpO2: 97%        Physical Exam     Lab Review:   Lab Results   Component Value Date     2024    K 4.3 2024     2024    CO2 24 2024    BUN 21 2024    CREATININE 1.05 2024    GLUCOSE 130 (H) 2024    CALCIUM 8.6 2024     Lab Results   Component Value Date    CHOL 262 (H) 10/10/2019    TRIG 312 (H) 10/10/2019    HDL 31 (L) 10/10/2019    LDLDIRECT 192 (H) 2019       Lab Results   Component Value Date    LDLCALC 169 (H) 10/10/2019        Palpitations  Rather infrequent, non-exertional. Observe. Will consider a monitor should they become more frequent.     Preop cardiovascular exam  The patient is of low risk for a surgical procedure that carries an inherent low risk of adverse cardiac events. In the absence of acute coronary syndrome, acutely decompensated heart failure, hemodynamically significant valvular disease, or  malignant arrhythmia I would consider this risk acceptable to proceed without further cardiac workup or intervention.

## 2025-01-27 ENCOUNTER — OFFICE VISIT (OUTPATIENT)
Dept: CARDIOLOGY | Facility: CLINIC | Age: 50
End: 2025-01-27
Payer: COMMERCIAL

## 2025-01-27 VITALS
HEART RATE: 94 BPM | SYSTOLIC BLOOD PRESSURE: 128 MMHG | OXYGEN SATURATION: 97 % | WEIGHT: 225 LBS | DIASTOLIC BLOOD PRESSURE: 66 MMHG | BODY MASS INDEX: 41.15 KG/M2

## 2025-01-27 DIAGNOSIS — R00.2 PALPITATIONS: ICD-10-CM

## 2025-01-27 DIAGNOSIS — Z01.810 PREOP CARDIOVASCULAR EXAM: Primary | ICD-10-CM

## 2025-01-27 PROCEDURE — 99214 OFFICE O/P EST MOD 30 MIN: CPT | Performed by: INTERNAL MEDICINE

## 2025-01-27 PROCEDURE — 1036F TOBACCO NON-USER: CPT | Performed by: INTERNAL MEDICINE

## 2025-01-27 PROCEDURE — 3074F SYST BP LT 130 MM HG: CPT | Performed by: INTERNAL MEDICINE

## 2025-01-27 PROCEDURE — 3078F DIAST BP <80 MM HG: CPT | Performed by: INTERNAL MEDICINE

## 2025-01-27 PROCEDURE — 99204 OFFICE O/P NEW MOD 45 MIN: CPT | Performed by: INTERNAL MEDICINE

## 2025-01-27 ASSESSMENT — ENCOUNTER SYMPTOMS
PALPITATIONS: 1
DEPRESSION: 0
DIZZINESS: 0
SHORTNESS OF BREATH: 0
WEAKNESS: 0
WEIGHT LOSS: 0
FEVER: 0
ORTHOPNEA: 0
IRREGULAR HEARTBEAT: 0
DYSPNEA ON EXERTION: 0
WHEEZING: 0
WEIGHT GAIN: 0
OCCASIONAL FEELINGS OF UNSTEADINESS: 0
SYNCOPE: 0
CLAUDICATION: 0
COUGH: 0
NEAR-SYNCOPE: 0
MYALGIAS: 0
LOSS OF SENSATION IN FEET: 0
DIAPHORESIS: 0
PND: 0

## 2025-01-27 ASSESSMENT — PATIENT HEALTH QUESTIONNAIRE - PHQ9
2. FEELING DOWN, DEPRESSED OR HOPELESS: NOT AT ALL
1. LITTLE INTEREST OR PLEASURE IN DOING THINGS: NOT AT ALL
SUM OF ALL RESPONSES TO PHQ9 QUESTIONS 1 AND 2: 0

## 2025-01-27 ASSESSMENT — PAIN SCALES - GENERAL: PAINLEVEL_OUTOF10: 0-NO PAIN

## 2025-01-27 NOTE — PROGRESS NOTES
Subjective   Patient ID: Derrick Butler is a 49 y.o. female who presents for a follow up to discuss colonoscopy results.    HPI  Patient presents today to discuss his colonoscopy results that was completed on 1/28/2025.  Patient last seen in clinic on 1/16/2025 for colonoscopy and egd consult.    Social History:  Tobacco Use - NO  Do you use any recreational drugs - NO  Alcohol Use - YES  Caffeine Intake - YES  Working - DISABILITY   Marital status -   Living with - ,DAUGHTER AND GRANDDAUGHTER    Past Medical History:   Diagnosis Date    Breast cancer (Multi) 03/01/2018    Right Breast - [facility]    Chronic rhinitis 05/04/2015    Rhinosinusitis    Depression     Fibromyalgia     Hx antineoplastic chemo 2018    rt breast cancer    Personal history of gestational diabetes     History of gestational diabetes    Personal history of other diseases of the musculoskeletal system and connective tissue     Personal history of fibromyalgia    Personal history of other diseases of the nervous system and sense organs     History of migraine    Personal history of other specified conditions     History of insomnia    Thyroid cancer (Multi) 2013     Past Surgical History:   Procedure Laterality Date    ADENOIDECTOMY  11/10/2016    Adenoidectomy    AXILLARY NODE DISSECTION  06/20/2018    Right Axillary Dissection    BACK SURGERY  02/10/2014    Back Surgery    BI MAMMO GUIDED BREAST RIGHT LOCALIZATION Right 05/16/2018    Wire localization lumpectomy right breast with sentinel node biopsy    BI STEREOTACTIC GUIDED BREAST RIGHT LOCALIZATION AND BIOPSY Right 04/18/2018    BI STEREOTACTIC GUIDED BREAST LOCALIZATION AND BIOPSY RIGHT [facility]    BI US GUIDED BREAST LOCALIZATION AND BIOPSY LEFT Left 03/22/2018    BI US GUIDED BREAST LOCALIZATION AND BIOPSY LEFT [facility]    BLADDER SURGERY  02/10/2014    Bladder Surgery    BREAST RECONSTRUCTION Bilateral 2018    Rt breast lumpectomy, bilat reconstruction     CHOLECYSTECTOMY  02/10/2014    Cholecystectomy    COLONOSCOPY      OTHER SURGICAL HISTORY  05/07/2019    Lumpectomy    OTHER SURGICAL HISTORY  05/07/2019    Breast reconstruction    OTHER SURGICAL HISTORY  05/07/2019    Axillary lymphadenectomy    OTHER SURGICAL HISTORY  05/22/2017    Primary Repair Of Ruptured Achilles Tendon    TONSILLECTOMY  02/10/2014    Tonsillectomy    TOTAL ABDOMINAL HYSTERECTOMY W/ BILATERAL SALPINGOOPHORECTOMY  2019    TOTAL THYROIDECTOMY  11/10/2016    Thyroid Surgery Total Thyroidectomy    TUBAL LIGATION  02/10/2014    Tubal Ligation     Family History   Problem Relation Name Age of Onset    Breast cancer Mother's Sister      Breast cancer Maternal Grandmother       Allergies   Allergen Reactions    Docetaxel Shortness of breath    Meloxicam Hives, Rash and Shortness of breath    Oxycodone-Acetaminophen Hives, Itching, Shortness of breath, Swelling and Rash    Pregabalin Palpitations       Review of Systems  There were no vitals taken for this visit.    Objective   Physical Exam    Assessment/Plan   {Assess/PlanSmartLinks:40194}         Carol Godinez MA 01/27/25 11:14 AM

## 2025-01-27 NOTE — ASSESSMENT & PLAN NOTE
Rather infrequent, non-exertional. Observe. Will consider a monitor should they become more frequent.

## 2025-01-27 NOTE — ASSESSMENT & PLAN NOTE
The patient is of low risk for a surgical procedure that carries an inherent low risk of adverse cardiac events. In the absence of acute coronary syndrome, acutely decompensated heart failure, hemodynamically significant valvular disease, or malignant arrhythmia I would consider this risk acceptable to proceed without further cardiac workup or intervention.

## 2025-01-28 ENCOUNTER — HOSPITAL ENCOUNTER (OUTPATIENT)
Dept: GASTROENTEROLOGY | Facility: HOSPITAL | Age: 50
Discharge: HOME | End: 2025-01-28
Payer: COMMERCIAL

## 2025-01-28 ENCOUNTER — ANESTHESIA EVENT (OUTPATIENT)
Dept: GASTROENTEROLOGY | Facility: HOSPITAL | Age: 50
End: 2025-01-28
Payer: COMMERCIAL

## 2025-01-28 ENCOUNTER — ANESTHESIA (OUTPATIENT)
Dept: GASTROENTEROLOGY | Facility: HOSPITAL | Age: 50
End: 2025-01-28
Payer: COMMERCIAL

## 2025-01-28 VITALS
RESPIRATION RATE: 18 BRPM | SYSTOLIC BLOOD PRESSURE: 152 MMHG | TEMPERATURE: 97.7 F | OXYGEN SATURATION: 98 % | HEART RATE: 70 BPM | DIASTOLIC BLOOD PRESSURE: 83 MMHG

## 2025-01-28 DIAGNOSIS — R10.13 EPIGASTRIC PAIN: ICD-10-CM

## 2025-01-28 DIAGNOSIS — Z86.0100 PERSONAL HISTORY OF COLON POLYPS, UNSPECIFIED: ICD-10-CM

## 2025-01-28 DIAGNOSIS — Z86.0100 PERSONAL HISTORY OF COLON POLYPS, UNSPECIFIED: Primary | ICD-10-CM

## 2025-01-28 PROBLEM — E66.9 OBESITY: Status: ACTIVE | Noted: 2025-01-28

## 2025-01-28 LAB — GLUCOSE BLD MANUAL STRIP-MCNC: 112 MG/DL (ref 74–99)

## 2025-01-28 PROCEDURE — 7100000001 HC RECOVERY ROOM TIME - INITIAL BASE CHARGE

## 2025-01-28 PROCEDURE — 7100000002 HC RECOVERY ROOM TIME - EACH INCREMENTAL 1 MINUTE

## 2025-01-28 PROCEDURE — 45378 DIAGNOSTIC COLONOSCOPY: CPT | Performed by: SURGERY

## 2025-01-28 PROCEDURE — 2500000004 HC RX 250 GENERAL PHARMACY W/ HCPCS (ALT 636 FOR OP/ED): Performed by: ANESTHESIOLOGIST ASSISTANT

## 2025-01-28 PROCEDURE — 0753T DGTZ GLS MCRSCP SLD LEVEL IV: CPT | Mod: TC,TRILAB,WESLAB | Performed by: SURGERY

## 2025-01-28 PROCEDURE — 43239 EGD BIOPSY SINGLE/MULTIPLE: CPT | Performed by: SURGERY

## 2025-01-28 PROCEDURE — 3700000001 HC GENERAL ANESTHESIA TIME - INITIAL BASE CHARGE

## 2025-01-28 PROCEDURE — A43239 PR EDG TRANSORAL BIOPSY SINGLE/MULTIPLE: Performed by: ANESTHESIOLOGIST ASSISTANT

## 2025-01-28 PROCEDURE — 3700000002 HC GENERAL ANESTHESIA TIME - EACH INCREMENTAL 1 MINUTE

## 2025-01-28 PROCEDURE — 7100000009 HC PHASE TWO TIME - INITIAL BASE CHARGE

## 2025-01-28 PROCEDURE — 7100000010 HC PHASE TWO TIME - EACH INCREMENTAL 1 MINUTE

## 2025-01-28 PROCEDURE — 82947 ASSAY GLUCOSE BLOOD QUANT: CPT

## 2025-01-28 PROCEDURE — A43239 PR EDG TRANSORAL BIOPSY SINGLE/MULTIPLE: Performed by: ANESTHESIOLOGY

## 2025-01-28 RX ORDER — HYDROMORPHONE HYDROCHLORIDE 0.2 MG/ML
0.2 INJECTION INTRAMUSCULAR; INTRAVENOUS; SUBCUTANEOUS EVERY 5 MIN PRN
Status: DISCONTINUED | OUTPATIENT
Start: 2025-01-28 | End: 2025-01-29 | Stop reason: HOSPADM

## 2025-01-28 RX ORDER — HYDRALAZINE HYDROCHLORIDE 20 MG/ML
5 INJECTION INTRAMUSCULAR; INTRAVENOUS EVERY 30 MIN PRN
Status: DISCONTINUED | OUTPATIENT
Start: 2025-01-28 | End: 2025-01-29 | Stop reason: HOSPADM

## 2025-01-28 RX ORDER — HYDROMORPHONE HYDROCHLORIDE 0.2 MG/ML
0.1 INJECTION INTRAMUSCULAR; INTRAVENOUS; SUBCUTANEOUS EVERY 5 MIN PRN
Status: DISCONTINUED | OUTPATIENT
Start: 2025-01-28 | End: 2025-01-29 | Stop reason: HOSPADM

## 2025-01-28 RX ORDER — MIDAZOLAM HYDROCHLORIDE 1 MG/ML
INJECTION, SOLUTION INTRAMUSCULAR; INTRAVENOUS AS NEEDED
Status: DISCONTINUED | OUTPATIENT
Start: 2025-01-28 | End: 2025-01-28

## 2025-01-28 RX ORDER — SODIUM CHLORIDE, SODIUM LACTATE, POTASSIUM CHLORIDE, CALCIUM CHLORIDE 600; 310; 30; 20 MG/100ML; MG/100ML; MG/100ML; MG/100ML
INJECTION, SOLUTION INTRAVENOUS CONTINUOUS PRN
Status: DISCONTINUED | OUTPATIENT
Start: 2025-01-28 | End: 2025-01-28

## 2025-01-28 RX ORDER — ALBUTEROL SULFATE 0.83 MG/ML
2.5 SOLUTION RESPIRATORY (INHALATION) ONCE AS NEEDED
Status: DISCONTINUED | OUTPATIENT
Start: 2025-01-28 | End: 2025-01-29 | Stop reason: HOSPADM

## 2025-01-28 RX ORDER — ONDANSETRON HYDROCHLORIDE 2 MG/ML
4 INJECTION, SOLUTION INTRAVENOUS ONCE AS NEEDED
Status: DISCONTINUED | OUTPATIENT
Start: 2025-01-28 | End: 2025-01-29 | Stop reason: HOSPADM

## 2025-01-28 RX ORDER — PROPOFOL 10 MG/ML
INJECTION, EMULSION INTRAVENOUS AS NEEDED
Status: DISCONTINUED | OUTPATIENT
Start: 2025-01-28 | End: 2025-01-28

## 2025-01-28 RX ORDER — LIDOCAINE HYDROCHLORIDE 10 MG/ML
0.1 INJECTION, SOLUTION INFILTRATION; PERINEURAL ONCE
Status: DISCONTINUED | OUTPATIENT
Start: 2025-01-28 | End: 2025-01-29 | Stop reason: HOSPADM

## 2025-01-28 RX ORDER — SODIUM CHLORIDE, SODIUM LACTATE, POTASSIUM CHLORIDE, CALCIUM CHLORIDE 600; 310; 30; 20 MG/100ML; MG/100ML; MG/100ML; MG/100ML
100 INJECTION, SOLUTION INTRAVENOUS CONTINUOUS
Status: DISCONTINUED | OUTPATIENT
Start: 2025-01-28 | End: 2025-01-29 | Stop reason: HOSPADM

## 2025-01-28 RX ADMIN — SODIUM CHLORIDE, POTASSIUM CHLORIDE, SODIUM LACTATE AND CALCIUM CHLORIDE: 600; 310; 30; 20 INJECTION, SOLUTION INTRAVENOUS at 08:56

## 2025-01-28 RX ADMIN — PROPOFOL 400 MG: 10 INJECTION, EMULSION INTRAVENOUS at 09:13

## 2025-01-28 RX ADMIN — PROPOFOL 150 MG: 10 INJECTION, EMULSION INTRAVENOUS at 09:06

## 2025-01-28 RX ADMIN — PROPOFOL 50 MG: 10 INJECTION, EMULSION INTRAVENOUS at 09:04

## 2025-01-28 RX ADMIN — MIDAZOLAM 2 MG: 1 INJECTION INTRAMUSCULAR; INTRAVENOUS at 09:04

## 2025-01-28 SDOH — HEALTH STABILITY: MENTAL HEALTH: CURRENT SMOKER: 0

## 2025-01-28 ASSESSMENT — PAIN - FUNCTIONAL ASSESSMENT
PAIN_FUNCTIONAL_ASSESSMENT: 0-10

## 2025-01-28 ASSESSMENT — COLUMBIA-SUICIDE SEVERITY RATING SCALE - C-SSRS
2. HAVE YOU ACTUALLY HAD ANY THOUGHTS OF KILLING YOURSELF?: NO
1. IN THE PAST MONTH, HAVE YOU WISHED YOU WERE DEAD OR WISHED YOU COULD GO TO SLEEP AND NOT WAKE UP?: NO
6. HAVE YOU EVER DONE ANYTHING, STARTED TO DO ANYTHING, OR PREPARED TO DO ANYTHING TO END YOUR LIFE?: NO

## 2025-01-28 ASSESSMENT — PAIN DESCRIPTION - DESCRIPTORS
DESCRIPTORS: ACHING
DESCRIPTORS: ACHING

## 2025-01-28 ASSESSMENT — PAIN SCALES - GENERAL
PAINLEVEL_OUTOF10: 0 - NO PAIN
PAINLEVEL_OUTOF10: 5 - MODERATE PAIN
PAINLEVEL_OUTOF10: 0 - NO PAIN
PAINLEVEL_OUTOF10: 2

## 2025-01-28 NOTE — PERIOPERATIVE NURSING NOTE
1035- Arrived to [facility] via cart with RN. Buford pulled over head. VSS. C/o epigastric discomfort. Discussed tylenol when at home, but declined. Per Dr Cool pt can resume all medications today. Set up with drink and snack, call light within reach. Spouse at bedside. Dr Garcia has previously updated spouse on procedure results and follow up.   1050- Pt epigastric discomfort resolving, now 2/10 ache. Discussed POC for discharge.   1105- Tolerating PO. VSS. IV discontinued, reviewed discharge instructions, verbalized understanding. Discharged home.

## 2025-01-28 NOTE — ANESTHESIA POSTPROCEDURE EVALUATION
Patient: Derrick Butler    Procedure Summary       Date: 01/28/25 Room / Location: [facility]    Anesthesia Start: 0856 Anesthesia Stop: 0957    Procedures:       COLONOSCOPY      EGD Diagnosis:       Personal history of colon polyps, unspecified      Epigastric pain    Scheduled Providers: Sadia Garcia MD; Barry Cool MD; BENTON Anders Responsible Provider: Barry Cool MD    Anesthesia Type: MAC ASA Status: 3            Anesthesia Type: MAC    Vitals Value Taken Time   /78 01/28/25 1030   Temp 36.2 °C (97.2 °F) 01/28/25 1030   Pulse 80 01/28/25 1032   Resp 16 01/28/25 1032   SpO2 92 % 01/28/25 1033   Vitals shown include unfiled device data.    Anesthesia Post Evaluation    Patient location during evaluation: PACU  Patient participation: complete - patient participated  Level of consciousness: awake and alert  Pain management: adequate  Airway patency: patent  Cardiovascular status: acceptable  Respiratory status: acceptable  Hydration status: acceptable  Postoperative Nausea and Vomiting: none        There were no known notable events for this encounter.

## 2025-01-28 NOTE — ANESTHESIA PREPROCEDURE EVALUATION
Patient: Derrick Butler    Procedure Information       Date/Time: 01/28/25 0900    Scheduled providers: Sadia Garcia MD; Barry Cool MD; BENTON Anders    Procedures:       COLONOSCOPY      EGD    Location: [facility]          Past Medical History:   Diagnosis Date    Breast cancer (Multi) 03/01/2018    Right Breast - [facility]    Chronic rhinitis 05/04/2015    Rhinosinusitis    Depression     Fibromyalgia     Hx antineoplastic chemo 2018    rt breast cancer    Personal history of gestational diabetes     History of gestational diabetes    Personal history of other diseases of the musculoskeletal system and connective tissue     Personal history of fibromyalgia    Personal history of other diseases of the nervous system and sense organs     History of migraine    Personal history of other specified conditions     History of insomnia    Thyroid cancer (Multi) 2013      Relevant Problems   Cardiac   (+) Angina pectoris   (+) Atypical chest pain   (+) Hypertensive disorder      Pulmonary   (+) Dyspnea on exertion      Neuro   (+) Anxiety   (+) Cervical neuritis   (+) Piriformis syndrome   (+) Seizure (Multi)      GI   (+) Irritable bowel syndrome with constipation      Endocrine   (+) Carcinoma of thyroid gland (Multi)   (+) Hypothyroidism   (+) Malignant tumor of thyroid gland (Multi)   (+) Obesity      Hematology   (+) Anemia      Musculoskeletal   (+) Fibromyalgia      GYN   (+) Malignant neoplasm of unspecified site of right female breast   (+) Malignant neoplasm of upper-inner quadrant of female breast     Past Surgical History:   Procedure Laterality Date    ADENOIDECTOMY  11/10/2016    Adenoidectomy    AXILLARY NODE DISSECTION  06/20/2018    Right Axillary Dissection    BACK SURGERY  02/10/2014    Back Surgery    BI MAMMO GUIDED BREAST RIGHT LOCALIZATION Right 05/16/2018    Wire localization lumpectomy right breast with sentinel node biopsy    BI STEREOTACTIC GUIDED BREAST RIGHT  LOCALIZATION AND BIOPSY Right 04/18/2018    BI STEREOTACTIC GUIDED BREAST LOCALIZATION AND BIOPSY RIGHT [facility]    BI US GUIDED BREAST LOCALIZATION AND BIOPSY LEFT Left 03/22/2018    BI US GUIDED BREAST LOCALIZATION AND BIOPSY LEFT [facility]    BLADDER SURGERY  02/10/2014    Bladder Surgery    BREAST RECONSTRUCTION Bilateral 2018    Rt breast lumpectomy, bilat reconstruction    CHOLECYSTECTOMY  02/10/2014    Cholecystectomy    COLONOSCOPY      OTHER SURGICAL HISTORY  05/07/2019    Lumpectomy    OTHER SURGICAL HISTORY  05/07/2019    Breast reconstruction    OTHER SURGICAL HISTORY  05/07/2019    Axillary lymphadenectomy    OTHER SURGICAL HISTORY  05/22/2017    Primary Repair Of Ruptured Achilles Tendon    TONSILLECTOMY  02/10/2014    Tonsillectomy    TOTAL ABDOMINAL HYSTERECTOMY W/ BILATERAL SALPINGOOPHORECTOMY  2019    TOTAL THYROIDECTOMY  11/10/2016    Thyroid Surgery Total Thyroidectomy    TUBAL LIGATION  02/10/2014    Tubal Ligation      Clinical information reviewed:   Tobacco  Allergies  Meds   Med Hx  Surg Hx   Fam Hx  Soc Hx        NPO Detail:  NPO/Void Status  Carbohydrate Drink Given Prior to Surgery? : N  Date of Last Liquid: 01/28/25  Time of Last Liquid: 0700  Date of Last Solid: 01/26/25  Time of Last Solid: 2100  Last Intake Type: Clear fluids  Time of Last Void: 0700         Physical Exam    Airway  Mallampati: II  TM distance: >3 FB  Neck ROM: full     Cardiovascular    Dental    Pulmonary    Abdominal            Anesthesia Plan    History of general anesthesia?: yes  History of complications of general anesthesia?: no    ASA 3     MAC     The patient is not a current smoker.  Patient was not previously instructed to abstain from smoking on day of procedure.  Patient did not smoke on day of procedure.    intravenous induction   Postoperative administration of opioids is intended.  Anesthetic plan and risks discussed with patient.    Plan discussed with attending.

## 2025-01-31 LAB
LABORATORY COMMENT REPORT: NORMAL
PATH REPORT.FINAL DX SPEC: NORMAL
PATH REPORT.GROSS SPEC: NORMAL
PATH REPORT.MICROSCOPIC SPEC OTHER STN: NORMAL
PATH REPORT.RELEVANT HX SPEC: NORMAL
PATH REPORT.TOTAL CANCER: NORMAL

## 2025-02-03 NOTE — PROGRESS NOTES
Subjective   Patient ID: Derrick Butler is a 49 y.o. female who presents for a follow up to discuss colonoscopy results.    HPI  Patient presents today to discuss his colonoscopy results that was completed on 1/28/2025.  Patient last seen in clinic on 1/16/2025 for colonoscopy and egd consult.  Surgical pathology was collected and finalized.    FINAL DIAGNOSIS      A.  ANTRUM (STOMACH), BIOPSIES:               - MILD CHRONIC GASTRITIS WITH FEATURES OF A CHEMICAL (REACTIVE) GASTRITIS;                          NEGATIVE FOR DYSPLASIA/MALIGNANCY.              - H. PYLORI MICROORGANISMS NOT IDENTIFIED.              - NO EVIDENCE OF INTESTINAL METAPLASIA.      B.  POLYP (BODY - STOMACH), BIOPSIES:              - BENIGN FUNDIC GLAND POLYP.       Colonoscopy  Impression  Small hemorrhoids        Findings  Internal small hemorrhoids        Recommendation  Repeat colonoscopy in 5 years, due: 1/27/2030     EGD   Impression  The duodenal bulb and 2nd part of the duodenum appeared normal.  Multiple polyps measuring 5-9 mm in the body of the stomach; performed cold forceps biopsy with partial removal  Erythematous mucosa in the antrum; performed cold forceps biopsy to rule out H. pylori  The GE junction appeared normal.        Findings  The duodenal bulb and 2nd part of the duodenum appeared normal.  Multiple pedunculated polyps measuring 5-9 mm in the body of the stomach; performed cold forceps biopsy with partial removal for sampling  Patchy erythematous mucosa in the antrum; performed cold forceps biopsy to rule out H. pylori  The GE junction appeared normal.        Recommendation  There is no recommended follow-up for this procedure.        Indication  Personal history of colon polyps, unspecified    Social History:  Tobacco Use - NO  Do you use any recreational drugs - NO  Alcohol Use - YES  Caffeine Intake - YES  Working - DISABILITY   Marital status -   Living with - ,DAUGHTER AND GRANDDAUGHTER    Past Medical  History:   Diagnosis Date    Breast cancer (Multi) 03/01/2018    Right Breast - [facility]    Chronic rhinitis 05/04/2015    Rhinosinusitis    Depression     Fibromyalgia     Hx antineoplastic chemo 2018    rt breast cancer    Personal history of gestational diabetes     History of gestational diabetes    Personal history of other diseases of the musculoskeletal system and connective tissue     Personal history of fibromyalgia    Personal history of other diseases of the nervous system and sense organs     History of migraine    Personal history of other specified conditions     History of insomnia    Thyroid cancer (Multi) 2013     Past Surgical History:   Procedure Laterality Date    ADENOIDECTOMY  11/10/2016    Adenoidectomy    AXILLARY NODE DISSECTION  06/20/2018    Right Axillary Dissection    BACK SURGERY  02/10/2014    Back Surgery    BI MAMMO GUIDED BREAST RIGHT LOCALIZATION Right 05/16/2018    Wire localization lumpectomy right breast with sentinel node biopsy    BI STEREOTACTIC GUIDED BREAST RIGHT LOCALIZATION AND BIOPSY Right 04/18/2018    BI STEREOTACTIC GUIDED BREAST LOCALIZATION AND BIOPSY RIGHT [facility]    BI US GUIDED BREAST LOCALIZATION AND BIOPSY LEFT Left 03/22/2018    BI US GUIDED BREAST LOCALIZATION AND BIOPSY LEFT [facility]    BLADDER SURGERY  02/10/2014    Bladder Surgery    BREAST RECONSTRUCTION Bilateral 2018    Rt breast lumpectomy, bilat reconstruction    CHOLECYSTECTOMY  02/10/2014    Cholecystectomy    COLONOSCOPY      OTHER SURGICAL HISTORY  05/07/2019    Lumpectomy    OTHER SURGICAL HISTORY  05/07/2019    Breast reconstruction    OTHER SURGICAL HISTORY  05/07/2019    Axillary lymphadenectomy    OTHER SURGICAL HISTORY  05/22/2017    Primary Repair Of Ruptured Achilles Tendon    TONSILLECTOMY  02/10/2014    Tonsillectomy    TOTAL ABDOMINAL HYSTERECTOMY W/ BILATERAL SALPINGOOPHORECTOMY  2019    TOTAL THYROIDECTOMY  11/10/2016    Thyroid Surgery Total Thyroidectomy    TUBAL  LIGATION  02/10/2014    Tubal Ligation     Family History   Problem Relation Name Age of Onset    Breast cancer Mother's Sister      Breast cancer Maternal Grandmother       Allergies   Allergen Reactions    Docetaxel Shortness of breath    Meloxicam Hives, Rash and Shortness of breath    Oxycodone-Acetaminophen Hives, Itching, Shortness of breath, Swelling and Rash    Pregabalin Palpitations       Review of Systems  There were no vitals taken for this visit.    Objective   Physical Exam    Assessment/Plan   {Assess/PlanSmartLinks:89272}         JOSE GOMEZ MA 02/03/25 3:17 PM

## 2025-02-04 ENCOUNTER — APPOINTMENT (OUTPATIENT)
Dept: SURGERY | Facility: CLINIC | Age: 50
End: 2025-02-04
Payer: COMMERCIAL

## 2025-02-06 ENCOUNTER — TELEMEDICINE (OUTPATIENT)
Dept: SURGERY | Facility: CLINIC | Age: 50
End: 2025-02-06
Payer: COMMERCIAL

## 2025-02-06 ENCOUNTER — APPOINTMENT (OUTPATIENT)
Dept: SURGERY | Facility: CLINIC | Age: 50
End: 2025-02-06
Payer: COMMERCIAL

## 2025-02-06 ENCOUNTER — TELEPHONE (OUTPATIENT)
Dept: SURGERY | Facility: CLINIC | Age: 50
End: 2025-02-06
Payer: COMMERCIAL

## 2025-02-06 DIAGNOSIS — K29.30 CHRONIC SUPERFICIAL GASTRITIS WITHOUT BLEEDING: Primary | ICD-10-CM

## 2025-02-06 DIAGNOSIS — Z86.0100 PERSONAL HISTORY OF COLON POLYPS, UNSPECIFIED: ICD-10-CM

## 2025-02-06 ASSESSMENT — ENCOUNTER SYMPTOMS
LOSS OF SENSATION IN FEET: 0
OCCASIONAL FEELINGS OF UNSTEADINESS: 0
DEPRESSION: 0

## 2025-02-06 ASSESSMENT — PATIENT HEALTH QUESTIONNAIRE - PHQ9
1. LITTLE INTEREST OR PLEASURE IN DOING THINGS: NOT AT ALL
SUM OF ALL RESPONSES TO PHQ9 QUESTIONS 1 & 2: 0
2. FEELING DOWN, DEPRESSED OR HOPELESS: NOT AT ALL

## 2025-02-06 ASSESSMENT — LIFESTYLE VARIABLES
HOW MANY STANDARD DRINKS CONTAINING ALCOHOL DO YOU HAVE ON A TYPICAL DAY: 1 OR 2
AUDIT-C TOTAL SCORE: 2
HOW OFTEN DO YOU HAVE A DRINK CONTAINING ALCOHOL: 2-4 TIMES A MONTH
HOW OFTEN DO YOU HAVE SIX OR MORE DRINKS ON ONE OCCASION: NEVER
SKIP TO QUESTIONS 9-10: 1

## 2025-02-06 ASSESSMENT — COLUMBIA-SUICIDE SEVERITY RATING SCALE - C-SSRS
6. HAVE YOU EVER DONE ANYTHING, STARTED TO DO ANYTHING, OR PREPARED TO DO ANYTHING TO END YOUR LIFE?: NO
2. HAVE YOU ACTUALLY HAD ANY THOUGHTS OF KILLING YOURSELF?: NO
1. IN THE PAST MONTH, HAVE YOU WISHED YOU WERE DEAD OR WISHED YOU COULD GO TO SLEEP AND NOT WAKE UP?: NO

## 2025-02-06 NOTE — PROGRESS NOTES
Subjective   Patient ID: Derrick Butler is a 49 y.o. female who presents for discussion of colonoscopy results    HPI  Patient presents today to discuss his colonoscopy results that was completed on 1/28/2025.  Patient last seen in clinic on 1/16/2025 for colonoscopy and egd consult.  Surgical pathology was collected and finalized.    FINAL DIAGNOSIS      A.  ANTRUM (STOMACH), BIOPSIES:               - MILD CHRONIC GASTRITIS WITH FEATURES OF A CHEMICAL (REACTIVE) GASTRITIS;                          NEGATIVE FOR DYSPLASIA/MALIGNANCY.              - H. PYLORI MICROORGANISMS NOT IDENTIFIED.              - NO EVIDENCE OF INTESTINAL METAPLASIA.      B.  POLYP (BODY - STOMACH), BIOPSIES:              - BENIGN FUNDIC GLAND POLYP.       Colonoscopy  Impression  Small hemorrhoids        Findings  Internal small hemorrhoids        Recommendation  Repeat colonoscopy in 5 years, due: 1/27/2030     EGD   Impression  The duodenal bulb and 2nd part of the duodenum appeared normal.  Multiple polyps measuring 5-9 mm in the body of the stomach; performed cold forceps biopsy with partial removal  Erythematous mucosa in the antrum; performed cold forceps biopsy to rule out H. pylori  The GE junction appeared normal.        Findings  The duodenal bulb and 2nd part of the duodenum appeared normal.  Multiple pedunculated polyps measuring 5-9 mm in the body of the stomach; performed cold forceps biopsy with partial removal for sampling  Patchy erythematous mucosa in the antrum; performed cold forceps biopsy to rule out H. pylori  The GE junction appeared normal.        Recommendation  There is no recommended follow-up for this procedure.        Indication  Personal history of colon polyps, unspecified    Social History:  Tobacco Use - NO  Do you use any recreational drugs - NO  Alcohol Use - YES  Caffeine Intake - YES  Working - DISABILITY   Marital status -   Living with - ,DAUGHTER AND GRANDDAUGHTER    Past Medical History:    Diagnosis Date    Breast cancer (Multi) 03/01/2018    Right Breast - [facility]    Chronic rhinitis 05/04/2015    Rhinosinusitis    Depression     Fibromyalgia     Hx antineoplastic chemo 2018    rt breast cancer    Personal history of gestational diabetes     History of gestational diabetes    Personal history of other diseases of the musculoskeletal system and connective tissue     Personal history of fibromyalgia    Personal history of other diseases of the nervous system and sense organs     History of migraine    Personal history of other specified conditions     History of insomnia    Thyroid cancer (Multi) 2013     Family History   Problem Relation Name Age of Onset    Breast cancer Mother's Sister      Breast cancer Maternal Grandmother       Past Surgical History:   Procedure Laterality Date    ADENOIDECTOMY  11/10/2016    Adenoidectomy    AXILLARY NODE DISSECTION  06/20/2018    Right Axillary Dissection    BACK SURGERY  02/10/2014    Back Surgery    BI MAMMO GUIDED BREAST RIGHT LOCALIZATION Right 05/16/2018    Wire localization lumpectomy right breast with sentinel node biopsy    BI STEREOTACTIC GUIDED BREAST RIGHT LOCALIZATION AND BIOPSY Right 04/18/2018    BI STEREOTACTIC GUIDED BREAST LOCALIZATION AND BIOPSY RIGHT [facility]    BI US GUIDED BREAST LOCALIZATION AND BIOPSY LEFT Left 03/22/2018    BI US GUIDED BREAST LOCALIZATION AND BIOPSY LEFT [facility]    BLADDER SURGERY  02/10/2014    Bladder Surgery    BREAST RECONSTRUCTION Bilateral 2018    Rt breast lumpectomy, bilat reconstruction    CHOLECYSTECTOMY  02/10/2014    Cholecystectomy    COLONOSCOPY      COLONOSCOPY  01/28/2025    OTHER SURGICAL HISTORY  05/07/2019    Lumpectomy    OTHER SURGICAL HISTORY  05/07/2019    Breast reconstruction    OTHER SURGICAL HISTORY  05/07/2019    Axillary lymphadenectomy    OTHER SURGICAL HISTORY  05/22/2017    Primary Repair Of Ruptured Achilles Tendon    TONSILLECTOMY  02/10/2014    Tonsillectomy     TOTAL ABDOMINAL HYSTERECTOMY W/ BILATERAL SALPINGOOPHORECTOMY  2019    TOTAL THYROIDECTOMY  11/10/2016    Thyroid Surgery Total Thyroidectomy    TUBAL LIGATION  02/10/2014    Tubal Ligation     Allergies   Allergen Reactions    Docetaxel Shortness of breath    Meloxicam Hives, Rash and Shortness of breath    Oxycodone-Acetaminophen Hives, Itching, Shortness of breath, Swelling and Rash    Pregabalin Palpitations       Review of Systems  There were no vitals taken for this visit.   Objective   Physical Exam    Assessment/Plan   Problem List Items Addressed This Visit             ICD-10-CM    Personal history of colon polyps, unspecified Z86.0100     Patient with personal history of colon polyps.  Recent colonoscopy performed without any evidence of polyps.  Minimal small hemorrhoids.  Recommend repeat colonoscopy in 5 years.   The entire endoscopic procedure was discussed.  pathology from the procedure were thoroughly reviewed with the patient.          Chronic superficial gastritis without bleeding - Primary K29.30     Patient status post EGD with antral biopsies revealed mild chronic gastritis features consistent with chemical reactive gastritis.  Patient was negative for H. pylori.  Had normal GE junction.  Patient had gastric polyps that on 1 biopsy was found to be benign fundic gland polyp.  Patient reassured nothing on the EGD to explain her symptoms.  Patient indicates she already has a follow-up with GI.  Of note also no evidence of hiatal hernia on EGD                 Sadia Garcia MD 02/06/25 1:38 PM

## 2025-02-06 NOTE — TELEPHONE ENCOUNTER
Patient was changed to a virtual visit instead today. Was approved by Dr. Garcia.    Nahed Uribe CMA.

## 2025-02-06 NOTE — TELEPHONE ENCOUNTER
Derrick is sick and had to cancel her 1:00 appointment for today and would like to know if you call call her with results.

## 2025-02-06 NOTE — ASSESSMENT & PLAN NOTE
Patient status post EGD with antral biopsies revealed mild chronic gastritis features consistent with chemical reactive gastritis.  Patient was negative for H. pylori.  Had normal GE junction.  Patient had gastric polyps that on 1 biopsy was found to be benign fundic gland polyp.  Patient reassured nothing on the EGD to explain her symptoms.  Patient indicates she already has a follow-up with GI.  Of note also no evidence of hiatal hernia on EGD

## 2025-02-06 NOTE — ASSESSMENT & PLAN NOTE
Patient with personal history of colon polyps.  Recent colonoscopy performed without any evidence of polyps.  Minimal small hemorrhoids.  Recommend repeat colonoscopy in 5 years.   The entire endoscopic procedure was discussed.  pathology from the procedure were thoroughly reviewed with the patient.

## 2025-02-19 NOTE — PROGRESS NOTES
Derrick Butler female   1975 50 y.o.   [id number]      Chief Complaint  ***    History Of Present Illness  Derrick Butler is a 50 y.o. female presenting with ***. She denies breast biopsy or surgery. She denies family history breast cancer.      Patient has been seen in the past for adenocarcinoma right breast. Her surgery was in 2018. Patient had a T2 N1 invasive ductal carcinoma of the right breast that was grade 1 out of 3 ER/[place] positive HER2/maria elena negative. She was recommended to have postoperative chemotherapy had a Mediport placed had too much complication and stopped her therapy. She refused radiation therapy. She was recommended to take endocrine therapy. Patient is over 5 years out from her breast cancer diagnosis and continues to follow-up with the high risk nurse practitioner     BREAST IMAGIN2025 bialtearl diagnostic mammogram BI-RADS Category     REPRODUCTIVE HISTORY: menarche age, GP, first birth age, , OCP's, premenopausal, LMP,  no HRT, breast tissue                                   FAMILY CANCER HISTORY:      Surgical History  She has a past surgical history that includes Back surgery (02/10/2014); Tubal ligation (02/10/2014); Cholecystectomy (02/10/2014); Tonsillectomy (02/10/2014); Bladder surgery (02/10/2014); Other surgical history (2019); Other surgical history (2019); Other surgical history (2019); Other surgical history (2017); Adenoidectomy (11/10/2016); Total thyroidectomy (11/10/2016); BI stereotactic guided breast right localization and biopsy (Right, 2018); BI mammo guided breast right localization (Right, 2018); BI US guided breast localization and biopsy left (Left, 2018); Axillary node dissection (2018); Colonoscopy; Total abdominal hysterectomy w/ bilateral salpingoophorectomy (); Breast reconstruction (Bilateral, ); and Colonoscopy (2025).     Social History  She reports that she has never smoked. She has  never been exposed to tobacco smoke. She has never used smokeless tobacco. She reports that she does not currently use alcohol. She reports that she does not currently use drugs after having used the following drugs: Marijuana.    Family History  Family History   Problem Relation Name Age of Onset    Breast cancer Mother's Sister      Breast cancer Maternal Grandmother          Allergies  Docetaxel, Meloxicam, Oxycodone-acetaminophen, and Pregabalin    Medications  Current Outpatient Medications   Medication Instructions    albuterol 90 mcg/actuation inhaler 2 puffs, inhalation, Every 6 hours PRN    buPROPion SR (WELLBUTRIN SR) 150 mg, As needed    busPIRone (Buspar) 30 mg tablet 1 tablet, 2 times daily    calcitriol (ROCALTROL) 0.5 mcg, oral, Daily    calcium carbonate (CALTRATE 600 ORAL) 2 tablets, Daily    gabapentin (Neurontin) 600 mg tablet 1 tablet, Daily    L.acid/L.casei/B.bif/B.wilber/FOS (PROBIOTIC BLEND ORAL) Take by mouth.    levothyroxine (Synthroid, Levoxyl) 112 mcg tablet TAKE 2 TABLETS BY MOUTH ONCE DAILY ON AN EMPTY STOMACH    LORazepam (Ativan) 1 mg tablet 1.5 tablets, As needed    melatonin 10 mg tablet,disintegrating 1 tablet, Nightly    metFORMIN XR (GLUCOPHAGE-XR) 500 mg, Daily with breakfast    methocarbamol (ROBAXIN) 500 mg, oral, 4 times daily    ondansetron ODT (ZOFRAN-ODT) 4 mg, Every 8 hours PRN    pantoprazole (ProtoNix) 20 mg EC tablet 1 tablet, Daily    sertraline (Zoloft) 100 mg tablet 1.5 tablets, Daily    traZODone (Desyrel) 50 mg tablet 1 tablet, Nightly PRN    zolpidem (Ambien) 10 mg tablet 1 tablet, Nightly PRN         REVIEW OF SYSTEMS    Constitutional:  Negative for appetite change, fatigue, fever and unexpected weight change.   HENT:  Negative for ear pain, hearing loss, nosebleeds, sore throat and trouble swallowing.    Eyes:  Negative for discharge, itching and visual disturbance.   Respiratory:  Negative for cough, chest tightness and shortness of breath.    Cardiovascular:   Negative for chest pain, palpitations and leg swelling.   Breast: as indicated in HPI  Gastrointestinal:  Negative for abdominal pain, constipation, diarrhea and nausea.   Endocrine: Negative for cold intolerance and heat intolerance.   Genitourinary:  Negative for dysuria, frequency, hematuria, pelvic pain and vaginal bleeding.   Musculoskeletal:  Negative for arthralgias, back pain, gait problem, joint swelling and myalgias.   Skin:  Negative for color change and rash.   Allergic/Immunologic: Negative for environmental allergies and food allergies.   Neurological:  Negative for dizziness, tremors, speech difficulty, weakness, numbness and headaches.   Hematological:  Does not bruise/bleed easily.   Psychiatric/Behavioral:  Negative for agitation, dysphoric mood and sleep disturbance. The patient is not nervous/anxious.         Past Medical History  She has a past medical history of Breast cancer (Multi) (03/01/2018), Chronic rhinitis (05/04/2015), Depression, Fibromyalgia, antineoplastic chemo (2018), Personal history of gestational diabetes, Personal history of other diseases of the musculoskeletal system and connective tissue, Personal history of other diseases of the nervous system and sense organs, Personal history of other specified conditions, and Thyroid cancer (Multi) (2013).     Physical Exam  Patient is alert and oriented x3 and in a relaxed and appropriate mood. Her gait is steady and hand grasps are equal. Sclera is clear. The breasts are nearly symmetrical. The tissue is soft without palpable abnormalities, discrete nodules or masses. The skin and nipples appear normal. There is no cervical, supraclavicular or axillary lymphadenopathy. Heart rate and rhythm normal, S1 and S2 appreciated. The lungs are clear to auscultation bilaterally. Abdomen is soft and non-tender.       Physical Exam     Last Recorded Vitals  There were no vitals filed for this visit.    Relevant Results   Time was spent viewing  digital images of the radiology testing with the patient. I explained the results in depth, along with suggested explanation for follow up recommendations based on the testing results. BI-RADS Category ***    Imaging  No results found for this or any previous visit from the past 365 days.       Assessment/Plan       PLAN:  ***    Patient Discussion/Summary  Your clinical examination and imaging are normal. Please return in one year for bilateral screening mammogram and office visit or sooner if you have any problems or concerns.     You can see your health information, review clinical summaries from office visits & test results online when you follow your health with MY  Chart, a personal health record. To sign up go to [email or web address]. If you need assistance with signing up or trouble getting into your account call [employer] Patient Line 24/7 at [phone].    My office phone number is [phone] if you need to get in touch with me or have additional questions or concerns. Thank you for choosing [facility] and trusting me as your healthcare provider. I look forward to seeing you again at your next office visit. I am honored to be a provider on your health care team and I remain dedicated to helping you achieve your health goals.      Deanna Baca, REED-CNP

## 2025-02-20 DIAGNOSIS — E03.9 HYPOTHYROIDISM, UNSPECIFIED TYPE: ICD-10-CM

## 2025-02-20 DIAGNOSIS — E83.51 HYPOCALCEMIA: ICD-10-CM

## 2025-02-21 RX ORDER — CALCITRIOL 0.5 UG/1
0.5 CAPSULE ORAL DAILY
Qty: 90 CAPSULE | Refills: 3 | Status: SHIPPED | OUTPATIENT
Start: 2025-02-21

## 2025-02-21 RX ORDER — LEVOTHYROXINE SODIUM 112 UG/1
TABLET ORAL
Qty: 180 TABLET | Refills: 3 | Status: SHIPPED | OUTPATIENT
Start: 2025-02-21

## 2025-02-26 ENCOUNTER — APPOINTMENT (OUTPATIENT)
Dept: SURGICAL ONCOLOGY | Facility: CLINIC | Age: 50
End: 2025-02-26
Payer: COMMERCIAL

## 2025-02-26 ENCOUNTER — APPOINTMENT (OUTPATIENT)
Dept: RADIOLOGY | Facility: CLINIC | Age: 50
End: 2025-02-26
Payer: COMMERCIAL

## 2025-03-05 ENCOUNTER — APPOINTMENT (OUTPATIENT)
Dept: RADIOLOGY | Facility: CLINIC | Age: 50
End: 2025-03-05
Payer: COMMERCIAL

## 2025-03-10 ENCOUNTER — HOSPITAL ENCOUNTER (OUTPATIENT)
Dept: RADIOLOGY | Facility: HOSPITAL | Age: 50
Discharge: HOME | End: 2025-03-10
Payer: COMMERCIAL

## 2025-03-10 VITALS — WEIGHT: 225 LBS | BODY MASS INDEX: 41.41 KG/M2 | HEIGHT: 62 IN

## 2025-03-10 DIAGNOSIS — C50.211 MALIGNANT NEOPLASM OF UPPER-INNER QUADRANT OF RIGHT BREAST IN FEMALE, ESTROGEN RECEPTOR POSITIVE: ICD-10-CM

## 2025-03-10 DIAGNOSIS — Z17.0 MALIGNANT NEOPLASM OF UPPER-INNER QUADRANT OF RIGHT BREAST IN FEMALE, ESTROGEN RECEPTOR POSITIVE: ICD-10-CM

## 2025-03-10 PROCEDURE — 77062 BREAST TOMOSYNTHESIS BI: CPT | Performed by: RADIOLOGY

## 2025-03-10 PROCEDURE — 77066 DX MAMMO INCL CAD BI: CPT

## 2025-03-10 PROCEDURE — 77066 DX MAMMO INCL CAD BI: CPT | Performed by: RADIOLOGY

## 2025-03-15 ENCOUNTER — APPOINTMENT (OUTPATIENT)
Dept: RADIOLOGY | Facility: HOSPITAL | Age: 50
End: 2025-03-15
Payer: COMMERCIAL

## 2025-03-15 ENCOUNTER — HOSPITAL ENCOUNTER (EMERGENCY)
Facility: HOSPITAL | Age: 50
Discharge: HOME | End: 2025-03-15
Attending: EMERGENCY MEDICINE
Payer: COMMERCIAL

## 2025-03-15 ENCOUNTER — APPOINTMENT (OUTPATIENT)
Dept: CARDIOLOGY | Facility: HOSPITAL | Age: 50
End: 2025-03-15
Payer: COMMERCIAL

## 2025-03-15 VITALS
RESPIRATION RATE: 14 BRPM | TEMPERATURE: 97.7 F | HEART RATE: 95 BPM | SYSTOLIC BLOOD PRESSURE: 129 MMHG | OXYGEN SATURATION: 98 % | DIASTOLIC BLOOD PRESSURE: 96 MMHG

## 2025-03-15 DIAGNOSIS — R07.9 CHEST PAIN, UNSPECIFIED TYPE: Primary | ICD-10-CM

## 2025-03-15 DIAGNOSIS — E87.6 HYPOKALEMIA: ICD-10-CM

## 2025-03-15 DIAGNOSIS — R42 VERTIGO: ICD-10-CM

## 2025-03-15 DIAGNOSIS — I10 DIASTOLIC HYPERTENSION: ICD-10-CM

## 2025-03-15 DIAGNOSIS — R11.0 NAUSEA: ICD-10-CM

## 2025-03-15 LAB
ALBUMIN SERPL BCP-MCNC: 4.3 G/DL (ref 3.4–5)
ALP SERPL-CCNC: 63 U/L (ref 33–110)
ALT SERPL W P-5'-P-CCNC: 25 U/L (ref 7–45)
ANION GAP SERPL CALCULATED.3IONS-SCNC: 16 MMOL/L (ref 10–20)
APPEARANCE UR: CLEAR
AST SERPL W P-5'-P-CCNC: 20 U/L (ref 9–39)
B-HCG SERPL-ACNC: 2 MIU/ML
BASOPHILS # BLD AUTO: 0.05 X10*3/UL (ref 0–0.1)
BASOPHILS NFR BLD AUTO: 0.9 %
BILIRUB SERPL-MCNC: 0.4 MG/DL (ref 0–1.2)
BILIRUB UR STRIP.AUTO-MCNC: NEGATIVE MG/DL
BUN SERPL-MCNC: 24 MG/DL (ref 6–23)
CALCIUM SERPL-MCNC: 8.5 MG/DL (ref 8.6–10.3)
CARDIAC TROPONIN I PNL SERPL HS: <3 NG/L (ref 0–13)
CHLORIDE SERPL-SCNC: 105 MMOL/L (ref 98–107)
CO2 SERPL-SCNC: 19 MMOL/L (ref 21–32)
COLOR UR: NORMAL
CREAT SERPL-MCNC: 1.05 MG/DL (ref 0.5–1.05)
D DIMER PPP FEU-MCNC: 0.24 MG/L FEU (ref 0.19–0.5)
EGFRCR SERPLBLD CKD-EPI 2021: 66 ML/MIN/1.73M*2
EOSINOPHIL # BLD AUTO: 0.15 X10*3/UL (ref 0–0.7)
EOSINOPHIL NFR BLD AUTO: 2.8 %
ERYTHROCYTE [DISTWIDTH] IN BLOOD BY AUTOMATED COUNT: 13.2 % (ref 11.5–14.5)
FLUAV RNA RESP QL NAA+PROBE: NOT DETECTED
FLUBV RNA RESP QL NAA+PROBE: NOT DETECTED
GLUCOSE SERPL-MCNC: 134 MG/DL (ref 74–99)
GLUCOSE UR STRIP.AUTO-MCNC: NORMAL MG/DL
HCG UR QL IA.RAPID: NEGATIVE
HCT VFR BLD AUTO: 37.6 % (ref 36–46)
HGB BLD-MCNC: 12.8 G/DL (ref 12–16)
IMM GRANULOCYTES # BLD AUTO: 0 X10*3/UL (ref 0–0.7)
IMM GRANULOCYTES NFR BLD AUTO: 0 % (ref 0–0.9)
KETONES UR STRIP.AUTO-MCNC: NEGATIVE MG/DL
LEUKOCYTE ESTERASE UR QL STRIP.AUTO: NEGATIVE
LIPASE SERPL-CCNC: 65 U/L (ref 9–82)
LYMPHOCYTES # BLD AUTO: 2.93 X10*3/UL (ref 1.2–4.8)
LYMPHOCYTES NFR BLD AUTO: 55.2 %
MAGNESIUM SERPL-MCNC: 1.64 MG/DL (ref 1.6–2.4)
MCH RBC QN AUTO: 29.8 PG (ref 26–34)
MCHC RBC AUTO-ENTMCNC: 34 G/DL (ref 32–36)
MCV RBC AUTO: 87 FL (ref 80–100)
MONOCYTES # BLD AUTO: 0.45 X10*3/UL (ref 0.1–1)
MONOCYTES NFR BLD AUTO: 8.5 %
NEUTROPHILS # BLD AUTO: 1.73 X10*3/UL (ref 1.2–7.7)
NEUTROPHILS NFR BLD AUTO: 32.6 %
NITRITE UR QL STRIP.AUTO: NEGATIVE
NRBC BLD-RTO: 0 /100 WBCS (ref 0–0)
PH UR STRIP.AUTO: 6 [PH]
PLATELET # BLD AUTO: 266 X10*3/UL (ref 150–450)
POTASSIUM SERPL-SCNC: 3.4 MMOL/L (ref 3.5–5.3)
PROT SERPL-MCNC: 7 G/DL (ref 6.4–8.2)
PROT UR STRIP.AUTO-MCNC: NEGATIVE MG/DL
RBC # BLD AUTO: 4.3 X10*6/UL (ref 4–5.2)
RBC # UR STRIP.AUTO: NEGATIVE MG/DL
SARS-COV-2 RNA RESP QL NAA+PROBE: NOT DETECTED
SODIUM SERPL-SCNC: 137 MMOL/L (ref 136–145)
SP GR UR STRIP.AUTO: 1.01
UROBILINOGEN UR STRIP.AUTO-MCNC: NORMAL MG/DL
WBC # BLD AUTO: 5.3 X10*3/UL (ref 4.4–11.3)

## 2025-03-15 PROCEDURE — 96375 TX/PRO/DX INJ NEW DRUG ADDON: CPT | Mod: 59

## 2025-03-15 PROCEDURE — 99285 EMERGENCY DEPT VISIT HI MDM: CPT | Mod: 25 | Performed by: EMERGENCY MEDICINE

## 2025-03-15 PROCEDURE — 71046 X-RAY EXAM CHEST 2 VIEWS: CPT | Performed by: RADIOLOGY

## 2025-03-15 PROCEDURE — 2550000001 HC RX 255 CONTRASTS: Performed by: CLINICAL NURSE SPECIALIST

## 2025-03-15 PROCEDURE — 83735 ASSAY OF MAGNESIUM: CPT | Performed by: CLINICAL NURSE SPECIALIST

## 2025-03-15 PROCEDURE — 70450 CT HEAD/BRAIN W/O DYE: CPT | Performed by: RADIOLOGY

## 2025-03-15 PROCEDURE — 71046 X-RAY EXAM CHEST 2 VIEWS: CPT

## 2025-03-15 PROCEDURE — 2500000004 HC RX 250 GENERAL PHARMACY W/ HCPCS (ALT 636 FOR OP/ED): Performed by: EMERGENCY MEDICINE

## 2025-03-15 PROCEDURE — 2500000002 HC RX 250 W HCPCS SELF ADMINISTERED DRUGS (ALT 637 FOR MEDICARE OP, ALT 636 FOR OP/ED): Performed by: EMERGENCY MEDICINE

## 2025-03-15 PROCEDURE — 96374 THER/PROPH/DIAG INJ IV PUSH: CPT | Mod: 59

## 2025-03-15 PROCEDURE — 80053 COMPREHEN METABOLIC PANEL: CPT | Performed by: CLINICAL NURSE SPECIALIST

## 2025-03-15 PROCEDURE — 70450 CT HEAD/BRAIN W/O DYE: CPT

## 2025-03-15 PROCEDURE — 84702 CHORIONIC GONADOTROPIN TEST: CPT | Performed by: CLINICAL NURSE SPECIALIST

## 2025-03-15 PROCEDURE — 85300 ANTITHROMBIN III ACTIVITY: CPT | Performed by: CLINICAL NURSE SPECIALIST

## 2025-03-15 PROCEDURE — 36415 COLL VENOUS BLD VENIPUNCTURE: CPT | Performed by: CLINICAL NURSE SPECIALIST

## 2025-03-15 PROCEDURE — 84484 ASSAY OF TROPONIN QUANT: CPT | Performed by: CLINICAL NURSE SPECIALIST

## 2025-03-15 PROCEDURE — 93005 ELECTROCARDIOGRAM TRACING: CPT | Mod: 59

## 2025-03-15 PROCEDURE — 84484 ASSAY OF TROPONIN QUANT: CPT | Performed by: EMERGENCY MEDICINE

## 2025-03-15 PROCEDURE — 81025 URINE PREGNANCY TEST: CPT | Performed by: EMERGENCY MEDICINE

## 2025-03-15 PROCEDURE — 96361 HYDRATE IV INFUSION ADD-ON: CPT

## 2025-03-15 PROCEDURE — 83690 ASSAY OF LIPASE: CPT | Performed by: CLINICAL NURSE SPECIALIST

## 2025-03-15 PROCEDURE — 2500000001 HC RX 250 WO HCPCS SELF ADMINISTERED DRUGS (ALT 637 FOR MEDICARE OP): Performed by: CLINICAL NURSE SPECIALIST

## 2025-03-15 PROCEDURE — 93005 ELECTROCARDIOGRAM TRACING: CPT

## 2025-03-15 PROCEDURE — 87636 SARSCOV2 & INF A&B AMP PRB: CPT | Performed by: CLINICAL NURSE SPECIALIST

## 2025-03-15 PROCEDURE — 85025 COMPLETE CBC W/AUTO DIFF WBC: CPT | Performed by: CLINICAL NURSE SPECIALIST

## 2025-03-15 PROCEDURE — 81003 URINALYSIS AUTO W/O SCOPE: CPT | Performed by: EMERGENCY MEDICINE

## 2025-03-15 PROCEDURE — 2500000004 HC RX 250 GENERAL PHARMACY W/ HCPCS (ALT 636 FOR OP/ED): Performed by: CLINICAL NURSE SPECIALIST

## 2025-03-15 PROCEDURE — 71275 CT ANGIOGRAPHY CHEST: CPT

## 2025-03-15 RX ORDER — POTASSIUM CHLORIDE 1.5 G/1.58G
40 POWDER, FOR SOLUTION ORAL ONCE
Status: COMPLETED | OUTPATIENT
Start: 2025-03-15 | End: 2025-03-15

## 2025-03-15 RX ORDER — ONDANSETRON HYDROCHLORIDE 2 MG/ML
4 INJECTION, SOLUTION INTRAVENOUS ONCE
Status: COMPLETED | OUTPATIENT
Start: 2025-03-15 | End: 2025-03-15

## 2025-03-15 RX ORDER — ONDANSETRON 4 MG/1
4 TABLET, ORALLY DISINTEGRATING ORAL EVERY 8 HOURS PRN
Qty: 10 TABLET | Refills: 0 | Status: SHIPPED | OUTPATIENT
Start: 2025-03-15 | End: 2025-03-18

## 2025-03-15 RX ORDER — MECLIZINE HYDROCHLORIDE 25 MG/1
25 TABLET ORAL 3 TIMES DAILY PRN
Qty: 15 TABLET | Refills: 0 | Status: SHIPPED | OUTPATIENT
Start: 2025-03-15 | End: 2025-03-20

## 2025-03-15 RX ORDER — FAMOTIDINE 20 MG/1
20 TABLET, FILM COATED ORAL 2 TIMES DAILY
Qty: 20 TABLET | Refills: 0 | Status: SHIPPED | OUTPATIENT
Start: 2025-03-15 | End: 2025-03-25

## 2025-03-15 RX ORDER — MECLIZINE HYDROCHLORIDE 25 MG/1
25 TABLET ORAL ONCE
Status: COMPLETED | OUTPATIENT
Start: 2025-03-15 | End: 2025-03-15

## 2025-03-15 RX ORDER — FAMOTIDINE 10 MG/ML
40 INJECTION, SOLUTION INTRAVENOUS ONCE
Status: COMPLETED | OUTPATIENT
Start: 2025-03-15 | End: 2025-03-15

## 2025-03-15 RX ORDER — LORAZEPAM 2 MG/ML
1 INJECTION INTRAMUSCULAR ONCE
Status: COMPLETED | OUTPATIENT
Start: 2025-03-15 | End: 2025-03-15

## 2025-03-15 RX ORDER — KETOROLAC TROMETHAMINE 15 MG/ML
15 INJECTION, SOLUTION INTRAMUSCULAR; INTRAVENOUS ONCE
Status: COMPLETED | OUTPATIENT
Start: 2025-03-15 | End: 2025-03-15

## 2025-03-15 RX ORDER — MORPHINE SULFATE 4 MG/ML
4 INJECTION, SOLUTION INTRAMUSCULAR; INTRAVENOUS ONCE
Status: COMPLETED | OUTPATIENT
Start: 2025-03-15 | End: 2025-03-15

## 2025-03-15 RX ORDER — METOCLOPRAMIDE HYDROCHLORIDE 5 MG/ML
10 INJECTION INTRAMUSCULAR; INTRAVENOUS ONCE
Status: COMPLETED | OUTPATIENT
Start: 2025-03-15 | End: 2025-03-15

## 2025-03-15 RX ADMIN — POTASSIUM CHLORIDE 40 MEQ: 1.5 POWDER, FOR SOLUTION ORAL at 15:06

## 2025-03-15 RX ADMIN — KETOROLAC TROMETHAMINE 15 MG: 15 INJECTION, SOLUTION INTRAMUSCULAR; INTRAVENOUS at 15:05

## 2025-03-15 RX ADMIN — MECLIZINE HYDROCHLORIDE 25 MG: 25 TABLET ORAL at 15:06

## 2025-03-15 RX ADMIN — FAMOTIDINE 40 MG: 10 INJECTION, SOLUTION INTRAVENOUS at 15:05

## 2025-03-15 RX ADMIN — METOCLOPRAMIDE 10 MG: 5 INJECTION, SOLUTION INTRAMUSCULAR; INTRAVENOUS at 15:05

## 2025-03-15 RX ADMIN — SODIUM CHLORIDE 1000 ML: 9 INJECTION, SOLUTION INTRAVENOUS at 15:05

## 2025-03-15 RX ADMIN — LORAZEPAM 1 MG: 2 INJECTION INTRAMUSCULAR; INTRAVENOUS at 18:28

## 2025-03-15 RX ADMIN — ONDANSETRON 4 MG: 2 INJECTION, SOLUTION INTRAMUSCULAR; INTRAVENOUS at 15:06

## 2025-03-15 RX ADMIN — IOHEXOL 75 ML: 350 INJECTION, SOLUTION INTRAVENOUS at 18:41

## 2025-03-15 RX ADMIN — MORPHINE SULFATE 4 MG: 4 INJECTION, SOLUTION INTRAMUSCULAR; INTRAVENOUS at 15:06

## 2025-03-15 ASSESSMENT — PAIN SCALES - GENERAL: PAINLEVEL_OUTOF10: 0 - NO PAIN

## 2025-03-15 ASSESSMENT — LIFESTYLE VARIABLES
HAVE PEOPLE ANNOYED YOU BY CRITICIZING YOUR DRINKING: NO
EVER HAD A DRINK FIRST THING IN THE MORNING TO STEADY YOUR NERVES TO GET RID OF A HANGOVER: NO
EVER FELT BAD OR GUILTY ABOUT YOUR DRINKING: NO
TOTAL SCORE: 0
HAVE YOU EVER FELT YOU SHOULD CUT DOWN ON YOUR DRINKING: NO

## 2025-03-15 ASSESSMENT — HEART SCORE
ECG: NON-SPECIFIC REPOLARIZATION DISTURBANCE
TROPONIN: LESS THAN OR EQUAL TO NORMAL LIMIT
HEART SCORE: 4
AGE: 45-64
RISK FACTORS: 1-2 RISK FACTORS
HISTORY: MODERATELY SUSPICIOUS

## 2025-03-15 ASSESSMENT — PAIN - FUNCTIONAL ASSESSMENT: PAIN_FUNCTIONAL_ASSESSMENT: 0-10

## 2025-03-15 NOTE — DISCHARGE INSTRUCTIONS
Follow-up with your primary care physician within 1 to 2 days for further management of your current symptoms and repeat check of your blood pressure.    Follow-up with ENT and Ortho neurology within 1 week for further management of your vertigo.    Follow-up with cardiology within 1 week for further management of your chest pain      Return to the emergency department sooner with worsening of symptoms or onset of new symptoms      Do not drive or operate machinery until you are cleared to do so by your primary care provider.

## 2025-03-15 NOTE — PROGRESS NOTES
Attestation/Supervisory note for CARON Rosalie      The patient is a 50-year-old female presenting to the emergency department for evaluation of nausea, upset stomach, and dizziness.  She states that her symptoms started while she was at lunch around 1300.  She states that she felt like she was dizzy, as in the room was spinning, and became nauseated.  She states that she then felt anxious and developed substernal chest pain.  The chest pain is a dull aching heaviness.  No better or worse.  No radiation.  It is fairly constant.  She denies any fever or chills.  No sick contacts or recent travel.  No recent cough or congestion.  No headache or visual changes.  No difficulty swallowing or speaking.  No focal weakness or numbness.  No neck or back pain.  No shortness of breath.  No palpitations.  No diaphoresis.  No fever or chills.  No cough or congestion.  No urinary complaints.  No diarrhea or constipation.  No vaginal discharge.  No history of PE or DVT.  No history of CAD or ACS.  She does not smoke or drink.  No recent travel or immobility.  No recent surgery.  No recent injury or trauma.  No history of hypertension, hyperlipidemia or diabetes.  All pertinent positives and negatives are recorded above.  All other systems reviewed and otherwise negative.  Vital signs with diastolic hypertension but otherwise within normal limits.  Physical exam with a well-nourished well-developed female in no acute distress.  HEENT exam within normal limits.  She has no evidence of airway compromise or respiratory distress.  Abdominal exam is benign.  She does not have any gross motor, neurologic or vascular deficits on exam.  Pulses are equal bilaterally.  NIH stroke scale score of 0.      EKG with normal sinus rhythm at 97 bpm, normal axis, normal voltage, normal ST segment, normal T waves      Repeat EKG with normal sinus rhythm at 95 bpm, normal axis, normal voltage, normal ST segment, normal T waves      The patient does have  listed allergies to acetaminophen, NSAIDs, and opiates but she reports that aspirin only sometimes upsets her stomach and she only reacts to Percocet with hives.  She states that she has had morphine and other opiates in the past without any discomfort.  She has had NSAIDs in the past without any anaphylaxis, hives, shortness of breath..  She states that just upsets her stomach.  She was initially given IV morphine and IV Zofran prior to my assessment of her but still reports persistent symptoms.  She is agreeable with the plan to treat with IV Pepcid, IV Toradol, oral meclizine, IV Reglan and IV fluids.  These were ordered.      Diagnostic labs with a mild electrolyte imbalance but otherwise unremarkable.      Initial troponin < 3.  Repeat troponin < 3.  Third troponin < 3      Heart score 0      CT angio chest abdomen pelvis   Final Result   No evidence of aortic aneurysm or dissection.        Trace pericardial effusion.        Postsurgical change of prior cholecystectomy.        MACRO:   None        Signed by: Aj Burton 3/15/2025 7:27 PM   Dictation workstation:   [id number]      XR chest 2 views   Final Result   No acute cardiopulmonary process.        MACRO:   None        Signed by: Cheli Light 3/15/2025 4:14 PM   Dictation workstation:   [id number]      CT head wo IV contrast   Final Result   No acute intracranial abnormality.        MACRO:   None        Signed by: Cheli Light 3/15/2025 4:13 PM   Dictation workstation:   [id number]             The patient does not have any evidence of a STEMI on EKG or cardiac enzymes.  No events on telemetry.  The patient does not have any evidence of airway compromise or respiratory distress on exam.  She intermittently does appear anxious.  She was given medications with improvement in her symptoms.  CT head without evidence of intracranial hemorrhage, mass effect or CVA.  The patient does not have any indication for tPA/TNK given NIH stroke scale score of 0.   chest x-ray without acute process.  No evidence of pneumonia or pneumothorax.  No evidence of CHF.  No widening of the mediastinum.  Pulses are equal bilaterally.  The D-dimer was negative but the patient did report a recurrence of her chest pain.  A repeat EKG was obtained and it showed no evidence of ischemia or arrhythmia.  A CT angio chest was ordered.  IV Ativan was ordered the patient did report resolution of her symptoms.  The CT angio chest shows no evidence of PE or dissection.  No evidence of pneumonia or pneumothorax.  No widening of the mediastinum.  No evidence of CHF.      The patient was released in good condition in the company of her family member.  She will follow-up with her primary care physician within 1 to 2 days for further management of her current symptoms and repeat check of her blood pressure.  She was given referrals to neurology and to ENT for further evaluation of her vertigo.  She was given a referral to cardiology for further management of her chest pain.  She was given a prescription for Pepcid, Zofran and meclizine.  She will return to the emergency department sooner with worsening of symptoms or onset of new symptoms.  She was cautioned not to drive or operate machinery until she is cleared to do so by your primary care provider.      Impression/diagnosis:  Chest pain, substernal  Diastolic hypertension  Nausea  Vertigo      I personally saw the patient and made/approve the management plan and take responsibility for the patient management.      I independently interpreted the following study (S) EKG and diagnostic labs      I personally discussed the patient's management with the patient      I reviewed the results of the diagnostic labs and diagnostic imaging.  Formal radiology read was completed by the radiologist.      Smiley Fernando MD

## 2025-03-15 NOTE — Clinical Note
Derrick Butler was seen and treated in our emergency department on 3/15/2025.  She may return to work on 03/16/2025.       If you have any questions or concerns, please don't hesitate to call.      Smiley Fernando MD

## 2025-03-15 NOTE — ED PROVIDER NOTES
Department of Emergency Medicine   ED  Provider Note  Admit Date/RoomTime: 3/15/2025  1:32 PM  [facility]        History of Present Illness:  Chief Complaint   Patient presents with    Chest Pain         Derrick Butler is a 50 y.o. female history of breast cancer, no failure, anxiety, seizure, fibromyalgia presented to the emergency department complaints of chest pain onset of symptoms 1 hour prior to arrival.  Patient states she was sitting at lunch all of a sudden became lightheaded like the room was spinning and she went to pass out followed by nausea and then developed pain in her chest she denies any cough congestion runny nose sore throat no abdominal pain no difficulty moving her arms or legs.  No fall or injury.  No increased weight gain..  Patient states it hurts to press on her chest.  Reports strong family history of coronary artery disease in her father mother and sister.  She is a non-smoker.  Prediabetic    Review of Systems:   Pertinent positives and negatives are stated within HPI, all other systems reviewed and are negative.        --------------------------------------------- PAST HISTORY ---------------------------------------------  Past Medical History:  has a past medical history of Breast cancer (Multi) (03/01/2018), Chronic rhinitis (05/04/2015), Depression, Fibromyalgia, antineoplastic chemo (2018), Personal history of gestational diabetes, Personal history of other diseases of the musculoskeletal system and connective tissue, Personal history of other diseases of the nervous system and sense organs, Personal history of other specified conditions, and Thyroid cancer (Multi) (2013).  Past Surgical History:  has a past surgical history that includes Back surgery (02/10/2014); Tubal ligation (02/10/2014); Cholecystectomy (02/10/2014); Tonsillectomy (02/10/2014); Bladder surgery (02/10/2014); Other surgical history (05/07/2019); Other surgical history (05/07/2019); Other surgical history  (05/07/2019); Other surgical history (05/22/2017); Adenoidectomy (11/10/2016); Total thyroidectomy (11/10/2016); BI stereotactic guided breast right localization and biopsy (Right, 04/18/2018); BI mammo guided breast right localization (Right, 05/16/2018); BI US guided breast localization and biopsy left (Left, 03/22/2018); Axillary node dissection (06/20/2018); Colonoscopy; Total abdominal hysterectomy w/ bilateral salpingoophorectomy (2019); Breast reconstruction (Bilateral, 2018); and Colonoscopy (01/28/2025).  Social History:  reports that she has never smoked. She has never been exposed to tobacco smoke. She has never used smokeless tobacco. She reports that she does not currently use alcohol. She reports that she does not currently use drugs after having used the following drugs: Marijuana.  Family History: family history includes Breast cancer in her maternal grandmother and mother's sister.. Unless otherwise noted, family history is non contributory  The patient’s home medications have been reviewed.  Allergies: Docetaxel, Meloxicam, Oxycodone-acetaminophen, and Pregabalin        ---------------------------------------------------PHYSICAL EXAM--------------------------------------    GENERAL APPEARANCE: Awake and alert.   VITAL SIGNS: As per the nurses' triage record.  Elevated blood pressure 140/95  HEENT: Normocephalic, atraumatic. Extraocular muscles are intact. Pupils equal round and reactive to light. Conjunctiva are pink. Negative scleral icterus. Mucous membranes are moist. Tongue in the midline. Pharynx was without erythema or exudates, uvula midline  NECK: Soft Nontender and supple, full gross ROM, no meningeal signs.  CHEST: tender to palpation. Clear to auscultation bilaterally. No rales, rhonchi, or wheezing.   HEART: S1, S2. Regular rate and rhythm. No murmurs, gallops or rubs.  Strong and equal pulses in the extremities.   ABDOMEN: Soft, nontender, nondistended, positive bowel sounds, no  palpable masses.  MUSCULCSKELETAL: The calves are nontender to palpation. Full gross active range of motion.  Peripheral pulses intact.  NEUROLOGICAL: Awake, alert and oriented x 3. Power intact in the upper and lower extremities. Sensation is intact to light touch in the upper and lower extremities.   IMMUNOLOGICAL: No lymphatic streaking noted   DERM: No petechiae, rashes, or ecchymoses.          ------------------------- NURSING NOTES AND VITALS REVIEWED ---------------------------  The nursing notes within the ED encounter and vital signs as below have been reviewed by myself  BP (!) 148/95 (Patient Position: Lying)   Pulse 95   Temp 36.5 °C (97.7 °F) (Oral)   Resp 20   SpO2 97%     Oxygen Saturation Interpretation: 97% room air    The cardiac monitor revealed sinus rhythm with a heart rate in the 90s as interpreted by me. The cardiac monitor was ordered secondary to the patient's heart rate and to monitor the patient for dysrhythmia.       The patient’s available past medical records and past encounters were reviewed.          -----------------------DIAGNOSTIC RESULTS------------------------  LABS:    Labs Reviewed   COMPREHENSIVE METABOLIC PANEL - Abnormal       Result Value    Glucose 134 (*)     Sodium 137      Potassium 3.4 (*)     Chloride 105      Bicarbonate 19 (*)     Anion Gap 16      Urea Nitrogen 24 (*)     Creatinine 1.05      eGFR 66      Calcium 8.5 (*)     Albumin 4.3      Alkaline Phosphatase 63      Total Protein 7.0      AST 20      Bilirubin, Total 0.4      ALT 25     MAGNESIUM - Normal    Magnesium 1.64     HUMAN CHORIONIC GONADOTROPIN, SERUM QUANTITATIVE - Normal    HCG, Beta-Quantitative 2      Narrative:      Total HCG measurement is performed using the [employer] Access   Immunoassay which detects intact HCG and free beta HCG subunit.    This test is not indicated for use as a tumor marker.   HCG testing is performed using a different test methodology at [facility] than other [facility] hospitals. Direct result comparison   should only be made within the same method.       SARS-COV-2 AND INFLUENZA A/B PCR - Normal    Flu A Result Not Detected      Flu B Result Not Detected      Coronavirus 2019, PCR Not Detected      Narrative:     This assay is an FDA-cleared, in vitro diagnostic nucleic acid amplification test for the qualitative detection and differentiation of SARS CoV-2/ Influenza A/B from nasopharyngeal specimens collected from individuals with signs and symptoms of respiratory tract infections, and has been validated for use at [facility]. Negative results do not preclude COVID-19/ Influenza A/B infections and should not be used as the sole basis for diagnosis, treatment, or other management decisions. Testing for SARS CoV-2 is recommended only for patients who meet current clinical and/or epidemiological criteria defined by federal, state, or local public health directives.   LIPASE - Normal    Lipase 65      Narrative:     Venipuncture immediately after or during the administration of Metamizole may lead to falsely low results. Testing should be performed immediately prior to Metamizole dosing.   SERIAL TROPONIN-INITIAL - Normal    Troponin I, High Sensitivity <3      Narrative:     Less than 99th percentile of normal range cutoff-  Female and children under 18 years old <14 ng/L; Male <21 ng/L: Negative  Repeat testing should be performed if clinically indicated.     Female and children under 18 years old 14-50 ng/L; Male 21-50 ng/L:  Consistent with possible cardiac damage and possible increased clinical   risk. Serial measurements may help to assess extent of myocardial damage.     >50 ng/L: Consistent with cardiac damage, increased clinical risk and  myocardial infarction. Serial measurements may help assess extent of   myocardial damage.      NOTE: Children less than 1 year old may have higher baseline troponin   levels and results  should be interpreted in conjunction with the overall   clinical context.     NOTE: Troponin I testing is performed using a different   testing methodology at [facility] than at other   [facility]. Direct result comparisons should only   be made within the same method.   D-DIMER, NON VTE - Normal    D-Dimer Non VTE, Quant (mg/L FEU) 0.24      Narrative:     THROMBOEMBOLIC EVENTS CANNOT BE EXCLUDED SOLELY ON THE BASIS OF THE D-DIMER LEVEL BEING WITHIN THE NORMAL REFERENCE RANGE. D-DIMER LEVELS LESS THAN 0.5 MG/L FEU IN CONJUNCTION WITH A LOW CLINICAL PROBABILITY HAVE AN EXCELLENT NEGATIVE PREDICTIVE VALUE IN EXCLUDING A DIAGNOSIS OF PULMONARY EMBOLUS (PE) OR DEEP VEIN THROMBOSIS (DVT). ELEVATED D-DIMER LEVELS ARE NOT SPECIFIC TO PE OR DVT, AND MAY BE SEEN IN PATIENTS WITH DIC, ADVANCED AGE, PREGNANCY, MALIGNANCY, LIVER DISEASE, INFECTION, AND INFLAMMATORY CONDITIONS AMONG OTHERS. D-DIMER LEVELS MAY BE DECREASED IN PATIENTS RECEIVING ANTI-COAGULATION THERAPY.   SERIAL TROPONIN, 1 HOUR - Normal    Troponin I, High Sensitivity <3      Narrative:     Less than 99th percentile of normal range cutoff-  Female and children under 18 years old <14 ng/L; Male <21 ng/L: Negative  Repeat testing should be performed if clinically indicated.     Female and children under 18 years old 14-50 ng/L; Male 21-50 ng/L:  Consistent with possible cardiac damage and possible increased clinical   risk. Serial measurements may help to assess extent of myocardial damage.     >50 ng/L: Consistent with cardiac damage, increased clinical risk and  myocardial infarction. Serial measurements may help assess extent of   myocardial damage.      NOTE: Children less than 1 year old may have higher baseline troponin   levels and results should be interpreted in conjunction with the overall   clinical context.     NOTE: Troponin I testing is performed using a different   testing methodology at [facility] than at other    [facility]. Direct result comparisons should only   be made within the same method.   TROPONIN SERIES- (INITIAL, 1 HR)    Narrative:     The following orders were created for panel order Troponin I Series, High Sensitivity (0, 1 HR).  Procedure                               Abnormality         Status                     ---------                               -----------         ------                     Troponin I, High Sensiti...[[id number]]  Normal              Final result               Troponin, High Sensitivi...[[id number]]  Normal              Final result                 Please view results for these tests on the individual orders.   CBC WITH AUTO DIFFERENTIAL    WBC 5.3      nRBC 0.0      RBC 4.30      Hemoglobin 12.8      Hematocrit 37.6      MCV 87      MCH 29.8      MCHC 34.0      RDW 13.2      Platelets 266      Neutrophils % 32.6      Immature Granulocytes %, Automated 0.0      Lymphocytes % 55.2      Monocytes % 8.5      Eosinophils % 2.8      Basophils % 0.9      Neutrophils Absolute 1.73      Immature Granulocytes Absolute, Automated 0.00      Lymphocytes Absolute 2.93      Monocytes Absolute 0.45      Eosinophils Absolute 0.15      Basophils Absolute 0.05         As interpreted by me, the above displayed labs are abnormal. All other labs obtained during this visit were within normal range or not returned as of this dictation.      EKG Interpretation    1346 EKG interpretation  Obtained 1342 reviewed 1345  Normal sinus rhythm normal EKG no acute ST elevation depression or signs of acute ischemia rate 97  QRS 68 QTc 469 no bundle branch block nonspecific ST changes  Per my independent interpretation [SL]           XR chest 2 views   Final Result   No acute cardiopulmonary process.        MACRO:   None        Signed by: Cheli Light 3/15/2025 4:14 PM   Dictation workstation:   [id number]      CT head wo IV contrast   Final Result   No acute intracranial abnormality.        MACRO:    None        Signed by: Cheli Light 3/15/2025 4:13 PM   Dictation workstation:   [id number]      CT angio chest abdomen pelvis    (Results Pending)           XR chest 2 views   Final Result   No acute cardiopulmonary process.        MACRO:   None        Signed by: Cheli Light 3/15/2025 4:14 PM   Dictation workstation:   [id number]      CT head wo IV contrast   Final Result   No acute intracranial abnormality.        MACRO:   None        Signed by: Cheli Light 3/15/2025 4:13 PM   Dictation workstation:   [id number]      CT angio chest abdomen pelvis    (Results Pending)           ------------------------------ ED COURSE/MEDICAL DECISION MAKING----------------------  Medical Decision Making:   Exam: A medically appropriate exam performed, outlined above, given the known history and presentation.    History obtained from: Review of medical record nursing notes patient      Social Determinants of Health considered during this visit: Takes care of her self at home      PAST MEDICAL HISTORY/Chronic Conditions Affecting Care     has a past medical history of Breast cancer (Multi) (03/01/2018), Chronic rhinitis (05/04/2015), Depression, Fibromyalgia, antineoplastic chemo (2018), Personal history of gestational diabetes, Personal history of other diseases of the musculoskeletal system and connective tissue, Personal history of other diseases of the nervous system and sense organs, Personal history of other specified conditions, and Thyroid cancer (Multi) (2013).       CC/HPI Summary, Social Determinants of health, Records Reviewed, DDx, testing done/not done, ED Course, Reassessment, disposition considerations/shared decision making with patient, consults, disposition:   Presents with lightheadedness dizziness described as a room spinning followed by nausea then chest pain.  Plan  EKG, chest x-ray, CBC, CMP, pregnancy, magnesium, COVID flu, troponin, cardiac monitoring, normal saline, Pepcid, Antivert, Reglan,  morphine, Zofran, CT head, D-dimer, lipase    Medical Decision Making/Differential Diagnosis:  Differentials include not limited to vertigo versus electrode abnormality versus dehydration versus acute coronary syndrome versus musculoskeletal pain versus viral illness versus pneumonia  Review  Troponin less than 3  Magnesium 1.64  D-dimer negative  White blood cell count 5.3  Hemoglobin 12.8 134  Potassium 3.4 replaced otherwise electrolytes unremarkable with electrolyte imbalance noted.    LFTs unremarkable  Patient presented to the emergency department complaints of lightheaded dizziness nausea followed by chest pain.  Heart score of 4 secondary to moderate suspicion, with any nausea with chest pain.  Nonspecific T wave changes risk factors.  EKG per attending note normal sinus rhythm no ST elevation depression or signs of acute ischemia noted.  Nonspecific T waves noted troponin less than 3 patient had resolution of her pain and then it returned.  Currently pain-free.  COVID flu negative.  Lipase within normal limits.  Chest x-ray showed no acute cardiopulmonary process.  No elevation white blood cell count patient is not anemic.  D-dimer negative.  Potassium found to be low replaced normal nasion.  Otherwise electrolytes unremarkable with electrolyte imbalance noted.  Renal function at baseline.  LFTs unremarkable.  Lipase normal.  Pregnancy negative.  NIH 0.  Test of skew negative Van negative.  CT of the head showed No acute intracranial abnormality.  Due to her recurrent chest pain with the symptoms of lightheadedness CT angio of the chest abdomen pelvis ordered to evaluate for dissection.  Patient is resting position comfort at this time.  Reviewed laboratory data and test results with her and family.  Patient seen and evaluated with attending physician Dr. Fernando  Please see her note for final impression and disposition due to the end of my shift [id number]  Will follow-up on final test results and plan of care  for disposition discharge  PROCEDURES  Unless otherwise noted below, none      CONSULTS:   None      ED Course as of 03/15/25 1705   Sat Mar 15, 2025   1346 EKG interpretation  Obtained 1342 reviewed 1345  Normal sinus rhythm normal EKG no acute ST elevation depression or signs of acute ischemia rate 97  QRS 68 QTc 469 no bundle branch block nonspecific ST changes  Per my independent interpretation [SL]   1636 Patient continues to have chest pain.  Cardiac workup negative.  Chest x-ray no acute process.  Due to persistent chest pain CT angio of the chest ordered [TB]      ED Course User Index  [SL] Lucho Payton DO  [TB] REED Hernandez-CNP         Diagnoses as of 03/15/25 1705   Chest pain, unspecified type   Vertigo   Nausea   Diastolic hypertension   Hypokalemia         This patient has remained hemodynamically stable during their ED course.      Critical Care: none        Counseling:  The emergency provider has spoken with the patient family and discussed today’s results, in addition to providing specific details for the plan of care and counseling regarding the diagnosis and prognosis.  Questions are answered at this time and they are agreeable with the plan.         --------------------------------- IMPRESSION AND DISPOSITION ---------------------------------    IMPRESSION  1. Chest pain, unspecified type    2. Vertigo    3. Nausea    4. Diastolic hypertension    5. Hypokalemia        DISPOSITION  Disposition: Pending  Patient condition is stable improved        NOTE: This report was transcribed using voice recognition software. Every effort was made to ensure accuracy; however, inadvertent computerized transcription errors may be present      BO Hernandez  03/15/25 1705

## 2025-03-16 LAB — HOLD SPECIMEN: NORMAL

## 2025-03-17 LAB
ATRIAL RATE: 95 BPM
P AXIS: 51 DEGREES
P OFFSET: 199 MS
P ONSET: 139 MS
PR INTERVAL: 166 MS
Q ONSET: 222 MS
QRS COUNT: 16 BEATS
QRS DURATION: 68 MS
QT INTERVAL: 380 MS
QTC CALCULATION(BAZETT): 477 MS
QTC FREDERICIA: 442 MS
R AXIS: 37 DEGREES
T AXIS: 61 DEGREES
T OFFSET: 412 MS
VENTRICULAR RATE: 95 BPM

## 2025-03-20 LAB
ATRIAL RATE: 97 BPM
P AXIS: 53 DEGREES
P OFFSET: 201 MS
P ONSET: 145 MS
PR INTERVAL: 152 MS
Q ONSET: 221 MS
QRS COUNT: 16 BEATS
QRS DURATION: 68 MS
QT INTERVAL: 370 MS
QTC CALCULATION(BAZETT): 469 MS
QTC FREDERICIA: 434 MS
R AXIS: 45 DEGREES
T AXIS: 66 DEGREES
T OFFSET: 406 MS
VENTRICULAR RATE: 97 BPM

## 2025-04-22 ENCOUNTER — OFFICE VISIT (OUTPATIENT)
Dept: GASTROENTEROLOGY | Facility: CLINIC | Age: 50
End: 2025-04-22
Payer: COMMERCIAL

## 2025-04-22 VITALS
DIASTOLIC BLOOD PRESSURE: 56 MMHG | BODY MASS INDEX: 39.75 KG/M2 | HEART RATE: 76 BPM | SYSTOLIC BLOOD PRESSURE: 83 MMHG | HEIGHT: 62 IN | TEMPERATURE: 97.9 F | WEIGHT: 216 LBS

## 2025-04-22 DIAGNOSIS — G90.81: Primary | ICD-10-CM

## 2025-04-22 DIAGNOSIS — G47.00 INSOMNIA, UNSPECIFIED TYPE: ICD-10-CM

## 2025-04-22 DIAGNOSIS — K59.9 MALDIGESTION SYNDROME: ICD-10-CM

## 2025-04-22 DIAGNOSIS — F41.9 ANXIETY: ICD-10-CM

## 2025-04-22 PROCEDURE — 3074F SYST BP LT 130 MM HG: CPT | Performed by: INTERNAL MEDICINE

## 2025-04-22 PROCEDURE — 99213 OFFICE O/P EST LOW 20 MIN: CPT | Performed by: INTERNAL MEDICINE

## 2025-04-22 PROCEDURE — 1036F TOBACCO NON-USER: CPT | Performed by: INTERNAL MEDICINE

## 2025-04-22 PROCEDURE — 3008F BODY MASS INDEX DOCD: CPT | Performed by: INTERNAL MEDICINE

## 2025-04-22 PROCEDURE — 3078F DIAST BP <80 MM HG: CPT | Performed by: INTERNAL MEDICINE

## 2025-04-22 RX ORDER — TRAZODONE HYDROCHLORIDE 150 MG/1
150 TABLET ORAL
COMMUNITY
Start: 2025-04-02

## 2025-04-22 RX ORDER — LORAZEPAM 2 MG/1
TABLET ORAL
COMMUNITY
Start: 2025-03-27

## 2025-04-22 RX ORDER — PANTOPRAZOLE SODIUM 40 MG/1
1 TABLET, DELAYED RELEASE ORAL
COMMUNITY
Start: 2025-03-25

## 2025-04-22 ASSESSMENT — PAIN SCALES - GENERAL: PAINLEVEL_OUTOF10: 3
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
## 2025-05-15 DIAGNOSIS — M79.7 FIBROMYALGIA: ICD-10-CM

## 2025-05-22 RX ORDER — METHOCARBAMOL 500 MG/1
500 TABLET, FILM COATED ORAL 4 TIMES DAILY
Qty: 120 TABLET | Refills: 3 | Status: SHIPPED | OUTPATIENT
Start: 2025-05-22

## 2025-07-07 ENCOUNTER — APPOINTMENT (OUTPATIENT)
Dept: GASTROENTEROLOGY | Facility: HOSPITAL | Age: 50
End: 2025-07-07
Payer: COMMERCIAL

## 2025-08-11 PROCEDURE — 99285 EMERGENCY DEPT VISIT HI MDM: CPT | Mod: 25 | Performed by: STUDENT IN AN ORGANIZED HEALTH CARE EDUCATION/TRAINING PROGRAM

## 2025-08-12 ENCOUNTER — APPOINTMENT (OUTPATIENT)
Dept: RADIOLOGY | Facility: HOSPITAL | Age: 50
End: 2025-08-12
Payer: COMMERCIAL

## 2025-08-12 ENCOUNTER — APPOINTMENT (OUTPATIENT)
Dept: CARDIOLOGY | Facility: HOSPITAL | Age: 50
End: 2025-08-12
Payer: COMMERCIAL

## 2025-08-12 ENCOUNTER — HOSPITAL ENCOUNTER (OUTPATIENT)
Facility: HOSPITAL | Age: 50
Setting detail: OBSERVATION
Discharge: HOME | End: 2025-08-13
Attending: STUDENT IN AN ORGANIZED HEALTH CARE EDUCATION/TRAINING PROGRAM | Admitting: STUDENT IN AN ORGANIZED HEALTH CARE EDUCATION/TRAINING PROGRAM
Payer: COMMERCIAL

## 2025-08-12 DIAGNOSIS — G43.819 OTHER MIGRAINE WITHOUT STATUS MIGRAINOSUS, INTRACTABLE: ICD-10-CM

## 2025-08-12 DIAGNOSIS — R20.0 NUMBNESS AND TINGLING: Primary | ICD-10-CM

## 2025-08-12 DIAGNOSIS — G45.8 OTHER TRANSIENT CEREBRAL ISCHEMIC ATTACKS AND RELATED SYNDROMES: ICD-10-CM

## 2025-08-12 DIAGNOSIS — R20.2 NUMBNESS AND TINGLING: Primary | ICD-10-CM

## 2025-08-12 DIAGNOSIS — R29.90 STROKE-LIKE SYMPTOMS: ICD-10-CM

## 2025-08-12 PROBLEM — F39 MOOD DISORDER: Status: ACTIVE | Noted: 2025-08-12

## 2025-08-12 LAB
ALBUMIN SERPL BCP-MCNC: 4.1 G/DL (ref 3.4–5)
ALP SERPL-CCNC: 60 U/L (ref 33–110)
ALT SERPL W P-5'-P-CCNC: 23 U/L (ref 7–45)
ANION GAP SERPL CALCULATED.3IONS-SCNC: 14 MMOL/L (ref 10–20)
AORTIC VALVE MEAN GRADIENT: 5 MMHG
AORTIC VALVE PEAK VELOCITY: 1.59 M/S
APPEARANCE UR: CLEAR
APTT PPP: 31.5 SECONDS (ref 22–32.5)
AST SERPL W P-5'-P-CCNC: 19 U/L (ref 9–39)
AV PEAK GRADIENT: 10 MMHG
AVA (PEAK VEL): 1.86 CM2
AVA (VTI): 1.99 CM2
BASOPHILS # BLD AUTO: 0.04 X10*3/UL (ref 0–0.1)
BASOPHILS NFR BLD AUTO: 0.8 %
BILIRUB SERPL-MCNC: 0.3 MG/DL (ref 0–1.2)
BILIRUB UR STRIP.AUTO-MCNC: NEGATIVE MG/DL
BNP SERPL-MCNC: 71 PG/ML (ref 0–99)
BUN SERPL-MCNC: 17 MG/DL (ref 6–23)
CALCIUM SERPL-MCNC: 8.2 MG/DL (ref 8.6–10.3)
CARDIAC TROPONIN I PNL SERPL HS: <3 NG/L (ref 0–13)
CHLORIDE SERPL-SCNC: 103 MMOL/L (ref 98–107)
CHOLEST SERPL-MCNC: 248 MG/DL (ref 0–199)
CHOLESTEROL/HDL RATIO: 5
CO2 SERPL-SCNC: 25 MMOL/L (ref 21–32)
COLOR UR: NORMAL
CREAT SERPL-MCNC: 1.08 MG/DL (ref 0.5–1.05)
EGFRCR SERPLBLD CKD-EPI 2021: 63 ML/MIN/1.73M*2
EJECTION FRACTION APICAL 4 CHAMBER: 66.6
EJECTION FRACTION: 63 %
EOSINOPHIL # BLD AUTO: 0.07 X10*3/UL (ref 0–0.7)
EOSINOPHIL NFR BLD AUTO: 1.4 %
ERYTHROCYTE [DISTWIDTH] IN BLOOD BY AUTOMATED COUNT: 12.8 % (ref 11.5–14.5)
EST. AVERAGE GLUCOSE BLD GHB EST-MCNC: 117 MG/DL
GLUCOSE BLD MANUAL STRIP-MCNC: 112 MG/DL (ref 74–99)
GLUCOSE BLD MANUAL STRIP-MCNC: 137 MG/DL (ref 74–99)
GLUCOSE BLD MANUAL STRIP-MCNC: 139 MG/DL (ref 74–99)
GLUCOSE BLD MANUAL STRIP-MCNC: 143 MG/DL (ref 74–99)
GLUCOSE SERPL-MCNC: 136 MG/DL (ref 74–99)
GLUCOSE UR STRIP.AUTO-MCNC: NORMAL MG/DL
HBA1C MFR BLD: 5.7 % (ref ?–5.7)
HCT VFR BLD AUTO: 34.5 % (ref 36–46)
HDLC SERPL-MCNC: 49.2 MG/DL
HGB BLD-MCNC: 12 G/DL (ref 12–16)
IMM GRANULOCYTES # BLD AUTO: 0 X10*3/UL (ref 0–0.7)
IMM GRANULOCYTES NFR BLD AUTO: 0 % (ref 0–0.9)
INR PPP: 1.1 (ref 0.9–1.2)
KETONES UR STRIP.AUTO-MCNC: NEGATIVE MG/DL
LDLC SERPL CALC-MCNC: 168 MG/DL
LEFT ATRIUM VOLUME AREA LENGTH INDEX BSA: 13.5 ML/M2
LEFT VENTRICULAR OUTFLOW TRACT DIAMETER: 1.8 CM
LEUKOCYTE ESTERASE UR QL STRIP.AUTO: NEGATIVE
LYMPHOCYTES # BLD AUTO: 2.6 X10*3/UL (ref 1.2–4.8)
LYMPHOCYTES NFR BLD AUTO: 50.6 %
MCH RBC QN AUTO: 29.4 PG (ref 26–34)
MCHC RBC AUTO-ENTMCNC: 34.8 G/DL (ref 32–36)
MCV RBC AUTO: 85 FL (ref 80–100)
MITRAL VALVE E/A RATIO: 1.11
MONOCYTES # BLD AUTO: 0.57 X10*3/UL (ref 0.1–1)
MONOCYTES NFR BLD AUTO: 11.1 %
NEUTROPHILS # BLD AUTO: 1.86 X10*3/UL (ref 1.2–7.7)
NEUTROPHILS NFR BLD AUTO: 36.1 %
NITRITE UR QL STRIP.AUTO: NEGATIVE
NON HDL CHOLESTEROL: 199 MG/DL (ref 0–149)
NRBC BLD-RTO: 0 /100 WBCS (ref 0–0)
PH UR STRIP.AUTO: 6.5 [PH]
PLATELET # BLD AUTO: 233 X10*3/UL (ref 150–450)
POTASSIUM SERPL-SCNC: 3.6 MMOL/L (ref 3.5–5.3)
PROT SERPL-MCNC: 6.9 G/DL (ref 6.4–8.2)
PROT UR STRIP.AUTO-MCNC: NEGATIVE MG/DL
PROTHROMBIN TIME: 11.5 SECONDS (ref 9.3–12.7)
RBC # BLD AUTO: 4.08 X10*6/UL (ref 4–5.2)
RBC # UR STRIP.AUTO: NEGATIVE MG/DL
RIGHT VENTRICLE FREE WALL PEAK S': 14.1 CM/S
SODIUM SERPL-SCNC: 138 MMOL/L (ref 136–145)
SP GR UR STRIP.AUTO: 1.01
T4 FREE SERPL-MCNC: 1.06 NG/DL (ref 0.61–1.12)
TRICUSPID ANNULAR PLANE SYSTOLIC EXCURSION: 2.6 CM
TRIGL SERPL-MCNC: 153 MG/DL (ref 0–149)
TSH SERPL-ACNC: 0.2 MIU/L (ref 0.44–3.98)
UROBILINOGEN UR STRIP.AUTO-MCNC: NORMAL MG/DL
VLDL: 31 MG/DL (ref 0–40)
WBC # BLD AUTO: 5.1 X10*3/UL (ref 4.4–11.3)

## 2025-08-12 PROCEDURE — 2500000004 HC RX 250 GENERAL PHARMACY W/ HCPCS (ALT 636 FOR OP/ED): Performed by: STUDENT IN AN ORGANIZED HEALTH CARE EDUCATION/TRAINING PROGRAM

## 2025-08-12 PROCEDURE — 96365 THER/PROPH/DIAG IV INF INIT: CPT | Mod: 59

## 2025-08-12 PROCEDURE — 97161 PT EVAL LOW COMPLEX 20 MIN: CPT | Mod: GP

## 2025-08-12 PROCEDURE — 2500000001 HC RX 250 WO HCPCS SELF ADMINISTERED DRUGS (ALT 637 FOR MEDICARE OP): Performed by: STUDENT IN AN ORGANIZED HEALTH CARE EDUCATION/TRAINING PROGRAM

## 2025-08-12 PROCEDURE — 70450 CT HEAD/BRAIN W/O DYE: CPT | Performed by: STUDENT IN AN ORGANIZED HEALTH CARE EDUCATION/TRAINING PROGRAM

## 2025-08-12 PROCEDURE — 70547 MR ANGIOGRAPHY NECK W/O DYE: CPT | Performed by: RADIOLOGY

## 2025-08-12 PROCEDURE — 84443 ASSAY THYROID STIM HORMONE: CPT | Performed by: STUDENT IN AN ORGANIZED HEALTH CARE EDUCATION/TRAINING PROGRAM

## 2025-08-12 PROCEDURE — 84484 ASSAY OF TROPONIN QUANT: CPT | Performed by: STUDENT IN AN ORGANIZED HEALTH CARE EDUCATION/TRAINING PROGRAM

## 2025-08-12 PROCEDURE — G0378 HOSPITAL OBSERVATION PER HR: HCPCS

## 2025-08-12 PROCEDURE — 96375 TX/PRO/DX INJ NEW DRUG ADDON: CPT | Mod: 59

## 2025-08-12 PROCEDURE — C8929 TTE W OR WO FOL WCON,DOPPLER: HCPCS

## 2025-08-12 PROCEDURE — 80053 COMPREHEN METABOLIC PANEL: CPT | Performed by: STUDENT IN AN ORGANIZED HEALTH CARE EDUCATION/TRAINING PROGRAM

## 2025-08-12 PROCEDURE — 70450 CT HEAD/BRAIN W/O DYE: CPT

## 2025-08-12 PROCEDURE — 71045 X-RAY EXAM CHEST 1 VIEW: CPT

## 2025-08-12 PROCEDURE — 85610 PROTHROMBIN TIME: CPT | Performed by: STUDENT IN AN ORGANIZED HEALTH CARE EDUCATION/TRAINING PROGRAM

## 2025-08-12 PROCEDURE — 84439 ASSAY OF FREE THYROXINE: CPT | Performed by: STUDENT IN AN ORGANIZED HEALTH CARE EDUCATION/TRAINING PROGRAM

## 2025-08-12 PROCEDURE — 70544 MR ANGIOGRAPHY HEAD W/O DYE: CPT | Mod: 59

## 2025-08-12 PROCEDURE — 93306 TTE W/DOPPLER COMPLETE: CPT | Performed by: INTERNAL MEDICINE

## 2025-08-12 PROCEDURE — 36415 COLL VENOUS BLD VENIPUNCTURE: CPT | Performed by: STUDENT IN AN ORGANIZED HEALTH CARE EDUCATION/TRAINING PROGRAM

## 2025-08-12 PROCEDURE — 70551 MRI BRAIN STEM W/O DYE: CPT | Performed by: RADIOLOGY

## 2025-08-12 PROCEDURE — 92610 EVALUATE SWALLOWING FUNCTION: CPT | Mod: GN

## 2025-08-12 PROCEDURE — 85025 COMPLETE CBC W/AUTO DIFF WBC: CPT | Performed by: STUDENT IN AN ORGANIZED HEALTH CARE EDUCATION/TRAINING PROGRAM

## 2025-08-12 PROCEDURE — 70544 MR ANGIOGRAPHY HEAD W/O DYE: CPT | Performed by: RADIOLOGY

## 2025-08-12 PROCEDURE — 71045 X-RAY EXAM CHEST 1 VIEW: CPT | Performed by: STUDENT IN AN ORGANIZED HEALTH CARE EDUCATION/TRAINING PROGRAM

## 2025-08-12 PROCEDURE — 96361 HYDRATE IV INFUSION ADD-ON: CPT

## 2025-08-12 PROCEDURE — 93005 ELECTROCARDIOGRAM TRACING: CPT

## 2025-08-12 PROCEDURE — 83036 HEMOGLOBIN GLYCOSYLATED A1C: CPT | Mod: TRILAB | Performed by: STUDENT IN AN ORGANIZED HEALTH CARE EDUCATION/TRAINING PROGRAM

## 2025-08-12 PROCEDURE — 2500000002 HC RX 250 W HCPCS SELF ADMINISTERED DRUGS (ALT 637 FOR MEDICARE OP, ALT 636 FOR OP/ED): Performed by: STUDENT IN AN ORGANIZED HEALTH CARE EDUCATION/TRAINING PROGRAM

## 2025-08-12 PROCEDURE — G0427 INPT/ED TELECONSULT70: HCPCS | Performed by: STUDENT IN AN ORGANIZED HEALTH CARE EDUCATION/TRAINING PROGRAM

## 2025-08-12 PROCEDURE — 81003 URINALYSIS AUTO W/O SCOPE: CPT | Performed by: STUDENT IN AN ORGANIZED HEALTH CARE EDUCATION/TRAINING PROGRAM

## 2025-08-12 PROCEDURE — 85730 THROMBOPLASTIN TIME PARTIAL: CPT | Performed by: STUDENT IN AN ORGANIZED HEALTH CARE EDUCATION/TRAINING PROGRAM

## 2025-08-12 PROCEDURE — 96376 TX/PRO/DX INJ SAME DRUG ADON: CPT | Mod: 59

## 2025-08-12 PROCEDURE — 97165 OT EVAL LOW COMPLEX 30 MIN: CPT | Mod: GO

## 2025-08-12 PROCEDURE — 2500000004 HC RX 250 GENERAL PHARMACY W/ HCPCS (ALT 636 FOR OP/ED): Mod: JZ | Performed by: STUDENT IN AN ORGANIZED HEALTH CARE EDUCATION/TRAINING PROGRAM

## 2025-08-12 PROCEDURE — 80061 LIPID PANEL: CPT | Performed by: STUDENT IN AN ORGANIZED HEALTH CARE EDUCATION/TRAINING PROGRAM

## 2025-08-12 PROCEDURE — 82947 ASSAY GLUCOSE BLOOD QUANT: CPT

## 2025-08-12 PROCEDURE — 99222 1ST HOSP IP/OBS MODERATE 55: CPT | Performed by: STUDENT IN AN ORGANIZED HEALTH CARE EDUCATION/TRAINING PROGRAM

## 2025-08-12 PROCEDURE — 83880 ASSAY OF NATRIURETIC PEPTIDE: CPT | Performed by: STUDENT IN AN ORGANIZED HEALTH CARE EDUCATION/TRAINING PROGRAM

## 2025-08-12 PROCEDURE — 70547 MR ANGIOGRAPHY NECK W/O DYE: CPT

## 2025-08-12 PROCEDURE — 70551 MRI BRAIN STEM W/O DYE: CPT

## 2025-08-12 RX ORDER — LORAZEPAM 1 MG/1
2 TABLET ORAL EVERY 8 HOURS PRN
Status: DISCONTINUED | OUTPATIENT
Start: 2025-08-12 | End: 2025-08-13 | Stop reason: HOSPADM

## 2025-08-12 RX ORDER — METHOCARBAMOL 500 MG/1
500 TABLET, FILM COATED ORAL 4 TIMES DAILY
Status: DISCONTINUED | OUTPATIENT
Start: 2025-08-12 | End: 2025-08-13 | Stop reason: HOSPADM

## 2025-08-12 RX ORDER — METOCLOPRAMIDE HYDROCHLORIDE 5 MG/ML
10 INJECTION INTRAMUSCULAR; INTRAVENOUS ONCE
Status: COMPLETED | OUTPATIENT
Start: 2025-08-12 | End: 2025-08-12

## 2025-08-12 RX ORDER — BUSPIRONE HYDROCHLORIDE 15 MG/1
15 TABLET ORAL 3 TIMES DAILY
Status: DISCONTINUED | OUTPATIENT
Start: 2025-08-12 | End: 2025-08-13 | Stop reason: HOSPADM

## 2025-08-12 RX ORDER — ZOLPIDEM TARTRATE 5 MG/1
10 TABLET ORAL NIGHTLY PRN
Status: DISCONTINUED | OUTPATIENT
Start: 2025-08-12 | End: 2025-08-13 | Stop reason: HOSPADM

## 2025-08-12 RX ORDER — ORPHENADRINE CITRATE 30 MG/ML
60 INJECTION INTRAMUSCULAR; INTRAVENOUS ONCE
Status: COMPLETED | OUTPATIENT
Start: 2025-08-12 | End: 2025-08-12

## 2025-08-12 RX ORDER — GABAPENTIN 600 MG/1
600 TABLET ORAL DAILY
Status: DISCONTINUED | OUTPATIENT
Start: 2025-08-12 | End: 2025-08-13 | Stop reason: HOSPADM

## 2025-08-12 RX ORDER — DIPHENHYDRAMINE HYDROCHLORIDE 50 MG/ML
25 INJECTION, SOLUTION INTRAMUSCULAR; INTRAVENOUS ONCE
Status: COMPLETED | OUTPATIENT
Start: 2025-08-12 | End: 2025-08-12

## 2025-08-12 RX ORDER — MAGNESIUM SULFATE 1 G/100ML
1 INJECTION INTRAVENOUS ONCE
Status: COMPLETED | OUTPATIENT
Start: 2025-08-12 | End: 2025-08-12

## 2025-08-12 RX ORDER — LABETALOL HYDROCHLORIDE 5 MG/ML
10 INJECTION, SOLUTION INTRAVENOUS EVERY 10 MIN PRN
Status: DISCONTINUED | OUTPATIENT
Start: 2025-08-12 | End: 2025-08-13 | Stop reason: HOSPADM

## 2025-08-12 RX ORDER — TRAZODONE HYDROCHLORIDE 100 MG/1
300 TABLET ORAL NIGHTLY
Status: DISCONTINUED | OUTPATIENT
Start: 2025-08-12 | End: 2025-08-13 | Stop reason: HOSPADM

## 2025-08-12 RX ORDER — CLOPIDOGREL BISULFATE 75 MG/1
75 TABLET ORAL DAILY
Status: DISCONTINUED | OUTPATIENT
Start: 2025-08-12 | End: 2025-08-12

## 2025-08-12 RX ORDER — ACETAMINOPHEN 500 MG
10 TABLET ORAL NIGHTLY
Status: DISCONTINUED | OUTPATIENT
Start: 2025-08-12 | End: 2025-08-13 | Stop reason: HOSPADM

## 2025-08-12 RX ORDER — CLOPIDOGREL BISULFATE 75 MG/1
300 TABLET ORAL ONCE
Status: COMPLETED | OUTPATIENT
Start: 2025-08-12 | End: 2025-08-12

## 2025-08-12 RX ORDER — GABAPENTIN 300 MG/1
300 CAPSULE ORAL ONCE
Status: COMPLETED | OUTPATIENT
Start: 2025-08-12 | End: 2025-08-12

## 2025-08-12 RX ORDER — ONDANSETRON 4 MG/1
4 TABLET, ORALLY DISINTEGRATING ORAL EVERY 8 HOURS PRN
Status: DISCONTINUED | OUTPATIENT
Start: 2025-08-12 | End: 2025-08-13 | Stop reason: HOSPADM

## 2025-08-12 RX ORDER — PANTOPRAZOLE SODIUM 40 MG/1
40 TABLET, DELAYED RELEASE ORAL
Status: DISCONTINUED | OUTPATIENT
Start: 2025-08-12 | End: 2025-08-13 | Stop reason: HOSPADM

## 2025-08-12 RX ORDER — ASPIRIN 81 MG/1
81 TABLET ORAL DAILY
Status: DISCONTINUED | OUTPATIENT
Start: 2025-08-12 | End: 2025-08-12

## 2025-08-12 RX ORDER — METHOCARBAMOL 500 MG/1
1000 TABLET, FILM COATED ORAL ONCE
Status: COMPLETED | OUTPATIENT
Start: 2025-08-12 | End: 2025-08-12

## 2025-08-12 RX ORDER — LEVOTHYROXINE SODIUM 112 UG/1
112 TABLET ORAL DAILY
Status: DISCONTINUED | OUTPATIENT
Start: 2025-08-12 | End: 2025-08-13 | Stop reason: HOSPADM

## 2025-08-12 RX ORDER — ASPIRIN 325 MG
325 TABLET ORAL ONCE
Status: COMPLETED | OUTPATIENT
Start: 2025-08-12 | End: 2025-08-12

## 2025-08-12 RX ORDER — ATORVASTATIN CALCIUM 40 MG/1
40 TABLET, FILM COATED ORAL NIGHTLY
Status: DISCONTINUED | OUTPATIENT
Start: 2025-08-12 | End: 2025-08-12

## 2025-08-12 RX ORDER — CLOPIDOGREL BISULFATE 75 MG/1
300 TABLET ORAL ONCE
Status: DISCONTINUED | OUTPATIENT
Start: 2025-08-12 | End: 2025-08-12

## 2025-08-12 RX ADMIN — METHOCARBAMOL 500 MG: 500 TABLET ORAL at 18:05

## 2025-08-12 RX ADMIN — Medication 10 MG: at 22:01

## 2025-08-12 RX ADMIN — METHOCARBAMOL 500 MG: 500 TABLET ORAL at 22:01

## 2025-08-12 RX ADMIN — LEVOTHYROXINE SODIUM 112 MCG: 0.11 TABLET ORAL at 05:45

## 2025-08-12 RX ADMIN — GABAPENTIN 600 MG: 600 TABLET, FILM COATED ORAL at 08:57

## 2025-08-12 RX ADMIN — METHOCARBAMOL 500 MG: 500 TABLET ORAL at 13:00

## 2025-08-12 RX ADMIN — GABAPENTIN 300 MG: 300 CAPSULE ORAL at 20:24

## 2025-08-12 RX ADMIN — ZOLPIDEM TARTRATE 10 MG: 5 TABLET ORAL at 22:03

## 2025-08-12 RX ADMIN — METHOCARBAMOL 1000 MG: 500 TABLET ORAL at 01:42

## 2025-08-12 RX ADMIN — ASPIRIN 325 MG ORAL TABLET 325 MG: 325 PILL ORAL at 03:54

## 2025-08-12 RX ADMIN — ORPHENADRINE CITRATE 60 MG: 30 INJECTION, SOLUTION INTRAMUSCULAR; INTRAVENOUS at 20:22

## 2025-08-12 RX ADMIN — BUSPIRONE HYDROCHLORIDE 15 MG: 15 TABLET ORAL at 08:57

## 2025-08-12 RX ADMIN — MAGNESIUM SULFATE IN DEXTROSE 1 G: 10 INJECTION, SOLUTION INTRAVENOUS at 13:45

## 2025-08-12 RX ADMIN — BUSPIRONE HYDROCHLORIDE 15 MG: 15 TABLET ORAL at 16:11

## 2025-08-12 RX ADMIN — PANTOPRAZOLE SODIUM 40 MG: 40 TABLET, DELAYED RELEASE ORAL at 05:45

## 2025-08-12 RX ADMIN — SODIUM CHLORIDE, SODIUM LACTATE, POTASSIUM CHLORIDE, AND CALCIUM CHLORIDE 1000 ML: 600; 310; 30; 20 INJECTION, SOLUTION INTRAVENOUS at 12:46

## 2025-08-12 RX ADMIN — METOCLOPRAMIDE 10 MG: 5 INJECTION, SOLUTION INTRAMUSCULAR; INTRAVENOUS at 12:46

## 2025-08-12 RX ADMIN — PERFLUTREN 2 ML OF DILUTION: 6.52 INJECTION, SUSPENSION INTRAVENOUS at 11:31

## 2025-08-12 RX ADMIN — BUSPIRONE HYDROCHLORIDE 15 MG: 15 TABLET ORAL at 22:01

## 2025-08-12 RX ADMIN — CLOPIDOGREL 75 MG: 75 TABLET ORAL at 09:24

## 2025-08-12 RX ADMIN — METOCLOPRAMIDE 10 MG: 5 INJECTION, SOLUTION INTRAMUSCULAR; INTRAVENOUS at 20:26

## 2025-08-12 RX ADMIN — ORPHENADRINE CITRATE 60 MG: 30 INJECTION, SOLUTION INTRAMUSCULAR; INTRAVENOUS at 01:42

## 2025-08-12 RX ADMIN — DIPHENHYDRAMINE HYDROCHLORIDE 25 MG: 50 INJECTION, SOLUTION INTRAMUSCULAR; INTRAVENOUS at 12:46

## 2025-08-12 RX ADMIN — ASPIRIN 81 MG: 81 TABLET, COATED ORAL at 09:24

## 2025-08-12 RX ADMIN — SERTRALINE HYDROCHLORIDE 150 MG: 50 TABLET ORAL at 08:57

## 2025-08-12 RX ADMIN — ATORVASTATIN CALCIUM 40 MG: 40 TABLET, FILM COATED ORAL at 03:54

## 2025-08-12 RX ADMIN — CLOPIDOGREL 300 MG: 75 TABLET ORAL at 04:24

## 2025-08-12 RX ADMIN — TRAZODONE HYDROCHLORIDE 300 MG: 100 TABLET ORAL at 22:01

## 2025-08-12 SDOH — SOCIAL STABILITY: SOCIAL INSECURITY: DO YOU FEEL UNSAFE GOING BACK TO THE PLACE WHERE YOU ARE LIVING?: NO

## 2025-08-12 SDOH — HEALTH STABILITY: PHYSICAL HEALTH: ON AVERAGE, HOW MANY DAYS PER WEEK DO YOU ENGAGE IN MODERATE TO STRENUOUS EXERCISE (LIKE A BRISK WALK)?: 3 DAYS

## 2025-08-12 SDOH — ECONOMIC STABILITY: FOOD INSECURITY: HOW HARD IS IT FOR YOU TO PAY FOR THE VERY BASICS LIKE FOOD, HOUSING, MEDICAL CARE, AND HEATING?: SOMEWHAT HARD

## 2025-08-12 SDOH — SOCIAL STABILITY: SOCIAL INSECURITY
WITHIN THE LAST YEAR, HAVE YOU BEEN RAPED OR FORCED TO HAVE ANY KIND OF SEXUAL ACTIVITY BY YOUR PARTNER OR EX-PARTNER?: NO

## 2025-08-12 SDOH — SOCIAL STABILITY: SOCIAL INSECURITY: HAS ANYONE EVER THREATENED TO HURT YOUR FAMILY OR YOUR PETS?: NO

## 2025-08-12 SDOH — ECONOMIC STABILITY: FOOD INSECURITY: WITHIN THE PAST 12 MONTHS, THE FOOD YOU BOUGHT JUST DIDN'T LAST AND YOU DIDN'T HAVE MONEY TO GET MORE.: NEVER TRUE

## 2025-08-12 SDOH — SOCIAL STABILITY: SOCIAL INSECURITY: WITHIN THE LAST YEAR, HAVE YOU BEEN HUMILIATED OR EMOTIONALLY ABUSED IN OTHER WAYS BY YOUR PARTNER OR EX-PARTNER?: NO

## 2025-08-12 SDOH — ECONOMIC STABILITY: INCOME INSECURITY: IN THE PAST 12 MONTHS HAS THE ELECTRIC, GAS, OIL, OR WATER COMPANY THREATENED TO SHUT OFF SERVICES IN YOUR HOME?: NO

## 2025-08-12 SDOH — ECONOMIC STABILITY: FOOD INSECURITY: WITHIN THE PAST 12 MONTHS, YOU WORRIED THAT YOUR FOOD WOULD RUN OUT BEFORE YOU GOT THE MONEY TO BUY MORE.: NEVER TRUE

## 2025-08-12 SDOH — ECONOMIC STABILITY: HOUSING INSECURITY: IN THE LAST 12 MONTHS, WAS THERE A TIME WHEN YOU WERE NOT ABLE TO PAY THE MORTGAGE OR RENT ON TIME?: NO

## 2025-08-12 SDOH — ECONOMIC STABILITY: HOUSING INSECURITY: IN THE PAST 12 MONTHS, HOW MANY TIMES HAVE YOU MOVED WHERE YOU WERE LIVING?: 0

## 2025-08-12 SDOH — ECONOMIC STABILITY: HOUSING INSECURITY: AT ANY TIME IN THE PAST 12 MONTHS, WERE YOU HOMELESS OR LIVING IN A SHELTER (INCLUDING NOW)?: NO

## 2025-08-12 SDOH — SOCIAL STABILITY: SOCIAL INSECURITY: HAVE YOU HAD THOUGHTS OF HARMING ANYONE ELSE?: NO

## 2025-08-12 SDOH — HEALTH STABILITY: PHYSICAL HEALTH: ON AVERAGE, HOW MANY MINUTES DO YOU ENGAGE IN EXERCISE AT THIS LEVEL?: 0 MIN

## 2025-08-12 SDOH — SOCIAL STABILITY: SOCIAL INSECURITY: WERE YOU ABLE TO COMPLETE ALL THE BEHAVIORAL HEALTH SCREENINGS?: YES

## 2025-08-12 SDOH — SOCIAL STABILITY: SOCIAL INSECURITY: WITHIN THE LAST YEAR, HAVE YOU BEEN AFRAID OF YOUR PARTNER OR EX-PARTNER?: NO

## 2025-08-12 SDOH — SOCIAL STABILITY: SOCIAL INSECURITY
WITHIN THE LAST YEAR, HAVE YOU BEEN KICKED, HIT, SLAPPED, OR OTHERWISE PHYSICALLY HURT BY YOUR PARTNER OR EX-PARTNER?: NO

## 2025-08-12 SDOH — SOCIAL STABILITY: SOCIAL INSECURITY: DOES ANYONE TRY TO KEEP YOU FROM HAVING/CONTACTING OTHER FRIENDS OR DOING THINGS OUTSIDE YOUR HOME?: NO

## 2025-08-12 SDOH — SOCIAL STABILITY: SOCIAL INSECURITY: DO YOU FEEL ANYONE HAS EXPLOITED OR TAKEN ADVANTAGE OF YOU FINANCIALLY OR OF YOUR PERSONAL PROPERTY?: NO

## 2025-08-12 SDOH — SOCIAL STABILITY: SOCIAL INSECURITY: HAVE YOU HAD ANY THOUGHTS OF HARMING ANYONE ELSE?: NO

## 2025-08-12 SDOH — ECONOMIC STABILITY: TRANSPORTATION INSECURITY: IN THE PAST 12 MONTHS, HAS LACK OF TRANSPORTATION KEPT YOU FROM MEDICAL APPOINTMENTS OR FROM GETTING MEDICATIONS?: NO

## 2025-08-12 SDOH — SOCIAL STABILITY: SOCIAL INSECURITY: ARE YOU OR HAVE YOU BEEN THREATENED OR ABUSED PHYSICALLY, EMOTIONALLY, OR SEXUALLY BY ANYONE?: NO

## 2025-08-12 SDOH — SOCIAL STABILITY: SOCIAL INSECURITY: ARE THERE ANY APPARENT SIGNS OF INJURIES/BEHAVIORS THAT COULD BE RELATED TO ABUSE/NEGLECT?: NO

## 2025-08-12 SDOH — SOCIAL STABILITY: SOCIAL INSECURITY: ABUSE: ADULT

## 2025-08-12 ASSESSMENT — COGNITIVE AND FUNCTIONAL STATUS - GENERAL
STANDING UP FROM CHAIR USING ARMS: A LITTLE
CLIMB 3 TO 5 STEPS WITH RAILING: A LITTLE
PERSONAL GROOMING: A LITTLE
MOVING TO AND FROM BED TO CHAIR: A LITTLE
WALKING IN HOSPITAL ROOM: A LITTLE
MOVING FROM LYING ON BACK TO SITTING ON SIDE OF FLAT BED WITH BEDRAILS: A LITTLE
DAILY ACTIVITIY SCORE: 24
DAILY ACTIVITIY SCORE: 19
TURNING FROM BACK TO SIDE WHILE IN FLAT BAD: A LITTLE
DRESSING REGULAR UPPER BODY CLOTHING: A LITTLE
TURNING FROM BACK TO SIDE WHILE IN FLAT BAD: A LITTLE
WALKING IN HOSPITAL ROOM: A LITTLE
PATIENT BASELINE BEDBOUND: NO
CLIMB 3 TO 5 STEPS WITH RAILING: A LITTLE
HELP NEEDED FOR BATHING: A LITTLE
DRESSING REGULAR LOWER BODY CLOTHING: A LITTLE
DAILY ACTIVITIY SCORE: 24
TOILETING: A LITTLE
MOBILITY SCORE: 22
DAILY ACTIVITIY SCORE: 24
MOVING TO AND FROM BED TO CHAIR: A LITTLE
STANDING UP FROM CHAIR USING ARMS: A LITTLE
MOBILITY SCORE: 19
MOBILITY SCORE: 24
MOBILITY SCORE: 20

## 2025-08-12 ASSESSMENT — ACTIVITIES OF DAILY LIVING (ADL)
HEARING - RIGHT EAR: FUNCTIONAL
ADEQUATE_TO_COMPLETE_ADL: YES
WALKS IN HOME: INDEPENDENT
WALKS IN HOME: INDEPENDENT
DRESSING YOURSELF: INDEPENDENT
BATHING: INDEPENDENT
PATIENT'S MEMORY ADEQUATE TO SAFELY COMPLETE DAILY ACTIVITIES?: YES
ADEQUATE_TO_COMPLETE_ADL: YES
GROOMING: INDEPENDENT
FEEDING YOURSELF: INDEPENDENT
FEEDING YOURSELF: INDEPENDENT
BATHING_ASSISTANCE: STAND BY
HEARING - RIGHT EAR: FUNCTIONAL
HEARING - LEFT EAR: FUNCTIONAL
ADL_ASSISTANCE: INDEPENDENT
JUDGMENT_ADEQUATE_SAFELY_COMPLETE_DAILY_ACTIVITIES: YES
BATHING: INDEPENDENT
TOILETING: INDEPENDENT
TOILETING: INDEPENDENT
GROOMING: INDEPENDENT
LACK_OF_TRANSPORTATION: NO
PATIENT'S MEMORY ADEQUATE TO SAFELY COMPLETE DAILY ACTIVITIES?: YES
DRESSING YOURSELF: INDEPENDENT
ADL_ASSISTANCE: INDEPENDENT
LACK_OF_TRANSPORTATION: NO
HEARING - LEFT EAR: FUNCTIONAL

## 2025-08-12 ASSESSMENT — ENCOUNTER SYMPTOMS: NUMBNESS: 1

## 2025-08-12 ASSESSMENT — PAIN - FUNCTIONAL ASSESSMENT
PAIN_FUNCTIONAL_ASSESSMENT: 0-10
PAIN_FUNCTIONAL_ASSESSMENT: FLACC (FACE, LEGS, ACTIVITY, CRY, CONSOLABILITY)
PAIN_FUNCTIONAL_ASSESSMENT: 0-10
PAIN_FUNCTIONAL_ASSESSMENT: FLACC (FACE, LEGS, ACTIVITY, CRY, CONSOLABILITY)
PAIN_FUNCTIONAL_ASSESSMENT: 0-10

## 2025-08-12 ASSESSMENT — PAIN SCALES - GENERAL
PAINLEVEL_OUTOF10: 3
PAINLEVEL_OUTOF10: 0 - NO PAIN
PAINLEVEL_OUTOF10: 3
PAINLEVEL_OUTOF10: 0 - NO PAIN

## 2025-08-12 ASSESSMENT — LIFESTYLE VARIABLES
SKIP TO QUESTIONS 9-10: 1
HOW MANY STANDARD DRINKS CONTAINING ALCOHOL DO YOU HAVE ON A TYPICAL DAY: PATIENT DOES NOT DRINK
AUDIT-C TOTAL SCORE: 0
HOW OFTEN DO YOU HAVE 6 OR MORE DRINKS ON ONE OCCASION: NEVER
HOW OFTEN DO YOU HAVE A DRINK CONTAINING ALCOHOL: NEVER
AUDIT-C TOTAL SCORE: 0

## 2025-08-12 ASSESSMENT — PATIENT HEALTH QUESTIONNAIRE - PHQ9
2. FEELING DOWN, DEPRESSED OR HOPELESS: SEVERAL DAYS
SUM OF ALL RESPONSES TO PHQ9 QUESTIONS 1 & 2: 2
1. LITTLE INTEREST OR PLEASURE IN DOING THINGS: SEVERAL DAYS

## 2025-08-13 ENCOUNTER — PHARMACY VISIT (OUTPATIENT)
Dept: PHARMACY | Facility: CLINIC | Age: 50
End: 2025-08-13
Payer: MEDICAID

## 2025-08-13 VITALS
OXYGEN SATURATION: 95 % | TEMPERATURE: 98.1 F | DIASTOLIC BLOOD PRESSURE: 79 MMHG | BODY MASS INDEX: 41.86 KG/M2 | RESPIRATION RATE: 16 BRPM | HEIGHT: 62 IN | SYSTOLIC BLOOD PRESSURE: 123 MMHG | HEART RATE: 84 BPM | WEIGHT: 227.5 LBS

## 2025-08-13 LAB
ATRIAL RATE: 83 BPM
GLUCOSE BLD MANUAL STRIP-MCNC: 104 MG/DL (ref 74–99)
P AXIS: 44 DEGREES
P OFFSET: 199 MS
P ONSET: 148 MS
PR INTERVAL: 146 MS
Q ONSET: 221 MS
QRS COUNT: 14 BEATS
QRS DURATION: 70 MS
QT INTERVAL: 404 MS
QTC CALCULATION(BAZETT): 474 MS
QTC FREDERICIA: 450 MS
R AXIS: 16 DEGREES
T AXIS: 46 DEGREES
T OFFSET: 423 MS
VENTRICULAR RATE: 83 BPM

## 2025-08-13 PROCEDURE — 94762 N-INVAS EAR/PLS OXIMTRY CONT: CPT

## 2025-08-13 PROCEDURE — 2500000001 HC RX 250 WO HCPCS SELF ADMINISTERED DRUGS (ALT 637 FOR MEDICARE OP): Performed by: STUDENT IN AN ORGANIZED HEALTH CARE EDUCATION/TRAINING PROGRAM

## 2025-08-13 PROCEDURE — 2500000002 HC RX 250 W HCPCS SELF ADMINISTERED DRUGS (ALT 637 FOR MEDICARE OP, ALT 636 FOR OP/ED): Performed by: STUDENT IN AN ORGANIZED HEALTH CARE EDUCATION/TRAINING PROGRAM

## 2025-08-13 PROCEDURE — 99239 HOSP IP/OBS DSCHRG MGMT >30: CPT | Performed by: NURSE PRACTITIONER

## 2025-08-13 PROCEDURE — 82947 ASSAY GLUCOSE BLOOD QUANT: CPT

## 2025-08-13 PROCEDURE — G0378 HOSPITAL OBSERVATION PER HR: HCPCS

## 2025-08-13 PROCEDURE — RXMED WILLOW AMBULATORY MEDICATION CHARGE

## 2025-08-13 RX ORDER — SUMATRIPTAN SUCCINATE 50 MG/1
50 TABLET ORAL ONCE AS NEEDED
Qty: 9 TABLET | Refills: 0 | Status: SHIPPED | OUTPATIENT
Start: 2025-08-13

## 2025-08-13 RX ADMIN — BUSPIRONE HYDROCHLORIDE 15 MG: 15 TABLET ORAL at 09:44

## 2025-08-13 RX ADMIN — PANTOPRAZOLE SODIUM 40 MG: 40 TABLET, DELAYED RELEASE ORAL at 06:33

## 2025-08-13 RX ADMIN — GABAPENTIN 600 MG: 600 TABLET, FILM COATED ORAL at 09:44

## 2025-08-13 RX ADMIN — METHOCARBAMOL 500 MG: 500 TABLET ORAL at 06:33

## 2025-08-13 RX ADMIN — SERTRALINE HYDROCHLORIDE 150 MG: 50 TABLET ORAL at 09:44

## 2025-08-13 RX ADMIN — LEVOTHYROXINE SODIUM 112 MCG: 0.11 TABLET ORAL at 06:33

## 2025-08-13 ASSESSMENT — PAIN SCALES - GENERAL: PAINLEVEL_OUTOF10: 0 - NO PAIN

## 2025-08-14 ENCOUNTER — PATIENT OUTREACH (OUTPATIENT)
Dept: CARE COORDINATION | Facility: CLINIC | Age: 50
End: 2025-08-14
Payer: COMMERCIAL

## 2025-08-26 ENCOUNTER — PATIENT OUTREACH (OUTPATIENT)
Dept: CARE COORDINATION | Facility: CLINIC | Age: 50
End: 2025-08-26
Payer: COMMERCIAL

## 2025-11-10 ENCOUNTER — APPOINTMENT (OUTPATIENT)
Dept: ENDOCRINOLOGY | Facility: CLINIC | Age: 50
End: 2025-11-10
Payer: COMMERCIAL